# Patient Record
Sex: MALE | Race: BLACK OR AFRICAN AMERICAN | Employment: UNEMPLOYED | URBAN - METROPOLITAN AREA
[De-identification: names, ages, dates, MRNs, and addresses within clinical notes are randomized per-mention and may not be internally consistent; named-entity substitution may affect disease eponyms.]

---

## 2017-06-01 ENCOUNTER — OFFICE VISIT (OUTPATIENT)
Dept: FAMILY MEDICINE CLINIC | Age: 52
End: 2017-06-01
Payer: COMMERCIAL

## 2017-06-01 ENCOUNTER — HOSPITAL ENCOUNTER (OUTPATIENT)
Age: 52
Setting detail: SPECIMEN
Discharge: HOME | End: 2017-06-01
Payer: COMMERCIAL

## 2017-06-01 VITALS
DIASTOLIC BLOOD PRESSURE: 85 MMHG | OXYGEN SATURATION: 93 % | HEIGHT: 65 IN | WEIGHT: 214 LBS | SYSTOLIC BLOOD PRESSURE: 133 MMHG | HEART RATE: 77 BPM | BODY MASS INDEX: 35.65 KG/M2 | TEMPERATURE: 97.5 F

## 2017-06-01 DIAGNOSIS — Z12.5 SCREENING FOR MALIGNANT NEOPLASM OF PROSTATE: ICD-10-CM

## 2017-06-01 DIAGNOSIS — M25.571 CHRONIC PAIN OF RIGHT ANKLE: Primary | ICD-10-CM

## 2017-06-01 DIAGNOSIS — Z13.9 SCREENING: ICD-10-CM

## 2017-06-01 DIAGNOSIS — Z23 NEED FOR PROPHYLACTIC VACCINATION AGAINST DIPHTHERIA-TETANUS-PERTUSSIS (DTP): ICD-10-CM

## 2017-06-01 DIAGNOSIS — Z87.81 H/O FRACTURE OF ANKLE: ICD-10-CM

## 2017-06-01 DIAGNOSIS — G89.29 CHRONIC PAIN OF RIGHT ANKLE: Primary | ICD-10-CM

## 2017-06-01 DIAGNOSIS — Z76.0 MEDICATION REFILL: ICD-10-CM

## 2017-06-01 LAB
ALBUMIN SERPL-MCNC: 4.3 G/DL (ref 3.5–5.2)
ALBUMIN/GLOBULIN RATIO: 1.4 (ref 1–2.5)
ALP BLD-CCNC: 101 U/L (ref 40–129)
ALT SERPL-CCNC: 42 U/L (ref 5–41)
ANION GAP SERPL CALCULATED.3IONS-SCNC: 12 MMOL/L (ref 9–17)
AST SERPL-CCNC: 25 U/L
BILIRUB SERPL-MCNC: 0.24 MG/DL (ref 0.3–1.2)
BUN BLDV-MCNC: 10 MG/DL (ref 6–20)
BUN/CREAT BLD: ABNORMAL (ref 9–20)
CALCIUM SERPL-MCNC: 9.5 MG/DL (ref 8.6–10.4)
CHLORIDE BLD-SCNC: 104 MMOL/L (ref 98–107)
CHOLESTEROL/HDL RATIO: 5.8
CHOLESTEROL: 226 MG/DL
CO2: 24 MMOL/L (ref 20–31)
CREAT SERPL-MCNC: 0.91 MG/DL (ref 0.7–1.2)
GFR AFRICAN AMERICAN: >60 ML/MIN
GFR NON-AFRICAN AMERICAN: >60 ML/MIN
GFR SERPL CREATININE-BSD FRML MDRD: ABNORMAL ML/MIN/{1.73_M2}
GFR SERPL CREATININE-BSD FRML MDRD: ABNORMAL ML/MIN/{1.73_M2}
GLUCOSE BLD-MCNC: 81 MG/DL (ref 70–99)
HCT VFR BLD CALC: 46.7 % (ref 41–53)
HDLC SERPL-MCNC: 39 MG/DL
HEMOGLOBIN: 15 G/DL (ref 13.5–17.5)
HEPATITIS C ANTIBODY: NONREACTIVE
HIV AG/AB: NONREACTIVE
LDL CHOLESTEROL: 158 MG/DL (ref 0–130)
MCH RBC QN AUTO: 24.7 PG (ref 26–34)
MCHC RBC AUTO-ENTMCNC: 32.2 G/DL (ref 31–37)
MCV RBC AUTO: 76.9 FL (ref 80–100)
PDW BLD-RTO: 16.8 % (ref 12.5–15.4)
PLATELET # BLD: 201 K/UL (ref 140–450)
PMV BLD AUTO: 9.9 FL (ref 6–12)
POTASSIUM SERPL-SCNC: 4.9 MMOL/L (ref 3.7–5.3)
PROSTATE SPECIFIC ANTIGEN: 0.95 UG/L
RBC # BLD: 6.07 M/UL (ref 4.5–5.9)
SODIUM BLD-SCNC: 140 MMOL/L (ref 135–144)
TOTAL PROTEIN: 7.4 G/DL (ref 6.4–8.3)
TRIGL SERPL-MCNC: 146 MG/DL
VLDLC SERPL CALC-MCNC: ABNORMAL MG/DL (ref 1–30)
WBC # BLD: 10.1 K/UL (ref 3.5–11)

## 2017-06-01 PROCEDURE — 99214 OFFICE O/P EST MOD 30 MIN: CPT | Performed by: NURSE PRACTITIONER

## 2017-06-01 RX ORDER — RANITIDINE 300 MG/1
300 TABLET ORAL NIGHTLY
Qty: 30 TABLET | Refills: 5 | Status: SHIPPED | OUTPATIENT
Start: 2017-06-01 | End: 2018-05-02 | Stop reason: SDUPTHER

## 2017-06-01 RX ORDER — SIMVASTATIN 10 MG
TABLET ORAL
Qty: 30 TABLET | Refills: 5 | Status: SHIPPED | OUTPATIENT
Start: 2017-06-01 | End: 2018-01-05 | Stop reason: SDUPTHER

## 2017-06-01 ASSESSMENT — ENCOUNTER SYMPTOMS
SHORTNESS OF BREATH: 0
CHEST TIGHTNESS: 0
CONSTIPATION: 0
ABDOMINAL PAIN: 0
NAUSEA: 0
DIARRHEA: 0
COUGH: 0

## 2017-06-01 ASSESSMENT — PATIENT HEALTH QUESTIONNAIRE - PHQ9
1. LITTLE INTEREST OR PLEASURE IN DOING THINGS: 0
2. FEELING DOWN, DEPRESSED OR HOPELESS: 0
SUM OF ALL RESPONSES TO PHQ9 QUESTIONS 1 & 2: 0
SUM OF ALL RESPONSES TO PHQ QUESTIONS 1-9: 0

## 2018-01-05 DIAGNOSIS — Z76.0 MEDICATION REFILL: ICD-10-CM

## 2018-01-08 RX ORDER — SIMVASTATIN 10 MG
TABLET ORAL
Qty: 90 TABLET | Refills: 1 | Status: SHIPPED | OUTPATIENT
Start: 2018-01-08 | End: 2018-05-02 | Stop reason: SDUPTHER

## 2018-05-02 ENCOUNTER — OFFICE VISIT (OUTPATIENT)
Dept: FAMILY MEDICINE CLINIC | Age: 53
End: 2018-05-02
Payer: COMMERCIAL

## 2018-05-02 VITALS
RESPIRATION RATE: 20 BRPM | TEMPERATURE: 96.5 F | DIASTOLIC BLOOD PRESSURE: 74 MMHG | SYSTOLIC BLOOD PRESSURE: 122 MMHG | WEIGHT: 210 LBS | HEART RATE: 76 BPM | HEIGHT: 65 IN | OXYGEN SATURATION: 97 % | BODY MASS INDEX: 34.99 KG/M2

## 2018-05-02 DIAGNOSIS — Z76.0 MEDICATION REFILL: ICD-10-CM

## 2018-05-02 DIAGNOSIS — K21.9 GASTROESOPHAGEAL REFLUX DISEASE, ESOPHAGITIS PRESENCE NOT SPECIFIED: ICD-10-CM

## 2018-05-02 DIAGNOSIS — Z71.6 ENCOUNTER FOR SMOKING CESSATION COUNSELING: ICD-10-CM

## 2018-05-02 DIAGNOSIS — M25.571 CHRONIC PAIN OF RIGHT ANKLE: Primary | ICD-10-CM

## 2018-05-02 DIAGNOSIS — E78.2 MIXED HYPERLIPIDEMIA: ICD-10-CM

## 2018-05-02 DIAGNOSIS — Z12.5 SCREENING FOR MALIGNANT NEOPLASM OF PROSTATE: ICD-10-CM

## 2018-05-02 DIAGNOSIS — G89.29 CHRONIC PAIN OF RIGHT ANKLE: Primary | ICD-10-CM

## 2018-05-02 PROCEDURE — 99396 PREV VISIT EST AGE 40-64: CPT | Performed by: NURSE PRACTITIONER

## 2018-05-02 RX ORDER — SIMVASTATIN 10 MG
TABLET ORAL
Qty: 90 TABLET | Refills: 1 | Status: SHIPPED | OUTPATIENT
Start: 2018-05-02 | End: 2019-01-08 | Stop reason: SDUPTHER

## 2018-05-02 RX ORDER — RANITIDINE 300 MG/1
300 TABLET ORAL NIGHTLY
Qty: 90 TABLET | Refills: 1 | Status: SHIPPED | OUTPATIENT
Start: 2018-05-02 | End: 2019-01-31 | Stop reason: SDUPTHER

## 2018-05-02 ASSESSMENT — ENCOUNTER SYMPTOMS
ABDOMINAL PAIN: 0
COUGH: 0
NAUSEA: 0
CONSTIPATION: 0
CHEST TIGHTNESS: 0
DIARRHEA: 0
SHORTNESS OF BREATH: 0

## 2018-07-25 ENCOUNTER — HOSPITAL ENCOUNTER (OUTPATIENT)
Age: 53
Setting detail: SPECIMEN
Discharge: HOME | End: 2018-07-25
Payer: COMMERCIAL

## 2018-07-25 DIAGNOSIS — Z12.5 SCREENING FOR MALIGNANT NEOPLASM OF PROSTATE: ICD-10-CM

## 2018-07-25 DIAGNOSIS — K21.9 GASTROESOPHAGEAL REFLUX DISEASE, ESOPHAGITIS PRESENCE NOT SPECIFIED: ICD-10-CM

## 2018-07-25 DIAGNOSIS — E78.2 MIXED HYPERLIPIDEMIA: ICD-10-CM

## 2018-07-25 LAB
ALBUMIN SERPL-MCNC: 4.4 G/DL (ref 3.5–5.2)
ALBUMIN/GLOBULIN RATIO: 1.3 (ref 1–2.5)
ALP BLD-CCNC: 103 U/L (ref 40–129)
ALT SERPL-CCNC: 31 U/L (ref 5–41)
ANION GAP SERPL CALCULATED.3IONS-SCNC: 19 MMOL/L (ref 9–17)
AST SERPL-CCNC: 25 U/L
BILIRUB SERPL-MCNC: 0.47 MG/DL (ref 0.3–1.2)
BUN BLDV-MCNC: 12 MG/DL (ref 6–20)
BUN/CREAT BLD: ABNORMAL (ref 9–20)
CALCIUM SERPL-MCNC: 9.3 MG/DL (ref 8.6–10.4)
CHLORIDE BLD-SCNC: 106 MMOL/L (ref 98–107)
CHOLESTEROL/HDL RATIO: 4.5
CHOLESTEROL: 181 MG/DL
CO2: 20 MMOL/L (ref 20–31)
CREAT SERPL-MCNC: 0.94 MG/DL (ref 0.7–1.2)
GFR AFRICAN AMERICAN: >60 ML/MIN
GFR NON-AFRICAN AMERICAN: >60 ML/MIN
GFR SERPL CREATININE-BSD FRML MDRD: ABNORMAL ML/MIN/{1.73_M2}
GFR SERPL CREATININE-BSD FRML MDRD: ABNORMAL ML/MIN/{1.73_M2}
GLUCOSE BLD-MCNC: 104 MG/DL (ref 70–99)
HCT VFR BLD CALC: 48.6 % (ref 40.7–50.3)
HDLC SERPL-MCNC: 40 MG/DL
HEMOGLOBIN: 15.1 G/DL (ref 13–17)
LDL CHOLESTEROL: 124 MG/DL (ref 0–130)
MCH RBC QN AUTO: 24 PG (ref 25.2–33.5)
MCHC RBC AUTO-ENTMCNC: 31.1 G/DL (ref 28.4–34.8)
MCV RBC AUTO: 77.3 FL (ref 82.6–102.9)
NRBC AUTOMATED: 0 PER 100 WBC
PDW BLD-RTO: 18 % (ref 11.8–14.4)
PLATELET # BLD: 212 K/UL (ref 138–453)
PMV BLD AUTO: 11.9 FL (ref 8.1–13.5)
POTASSIUM SERPL-SCNC: 5 MMOL/L (ref 3.7–5.3)
PROSTATE SPECIFIC ANTIGEN: 1 UG/L
RBC # BLD: 6.29 M/UL (ref 4.21–5.77)
SODIUM BLD-SCNC: 145 MMOL/L (ref 135–144)
TOTAL PROTEIN: 7.8 G/DL (ref 6.4–8.3)
TRIGL SERPL-MCNC: 87 MG/DL
VLDLC SERPL CALC-MCNC: ABNORMAL MG/DL (ref 1–30)
WBC # BLD: 10.2 K/UL (ref 3.5–11.3)

## 2018-10-09 ENCOUNTER — OFFICE VISIT (OUTPATIENT)
Dept: FAMILY MEDICINE CLINIC | Age: 53
End: 2018-10-09
Payer: COMMERCIAL

## 2018-10-09 VITALS
DIASTOLIC BLOOD PRESSURE: 86 MMHG | BODY MASS INDEX: 34.91 KG/M2 | WEIGHT: 209.8 LBS | HEART RATE: 72 BPM | SYSTOLIC BLOOD PRESSURE: 130 MMHG | OXYGEN SATURATION: 95 % | TEMPERATURE: 97.2 F

## 2018-10-09 DIAGNOSIS — M25.571 CHRONIC PAIN OF RIGHT ANKLE: Primary | ICD-10-CM

## 2018-10-09 DIAGNOSIS — G89.29 CHRONIC PAIN OF RIGHT ANKLE: Primary | ICD-10-CM

## 2018-10-09 PROCEDURE — 99213 OFFICE O/P EST LOW 20 MIN: CPT | Performed by: NURSE PRACTITIONER

## 2018-10-09 ASSESSMENT — PATIENT HEALTH QUESTIONNAIRE - PHQ9
SUM OF ALL RESPONSES TO PHQ QUESTIONS 1-9: 0
SUM OF ALL RESPONSES TO PHQ9 QUESTIONS 1 & 2: 0
2. FEELING DOWN, DEPRESSED OR HOPELESS: 0
SUM OF ALL RESPONSES TO PHQ QUESTIONS 1-9: 0
1. LITTLE INTEREST OR PLEASURE IN DOING THINGS: 0

## 2018-10-09 NOTE — PROGRESS NOTES
Systems   Constitutional: Negative for chills and fever. Cardiovascular: Negative for chest pain, palpitations and leg swelling. Musculoskeletal: Positive for arthralgias (right ankle). Other pertinent ROS in HPI  Objective:     /86 (Site: Left Upper Arm, Position: Sitting, Cuff Size: Large Adult)   Pulse 72   Temp 97.2 °F (36.2 °C) (Oral)   Wt 209 lb 12.8 oz (95.2 kg)   SpO2 95%   BMI 34.91 kg/m²    Physical Exam   Constitutional: He is oriented to person, place, and time. He appears well-developed and well-nourished. No distress. Cardiovascular: Normal rate, regular rhythm and normal heart sounds. Pulmonary/Chest: Effort normal and breath sounds normal.   Musculoskeletal:        Right ankle: He exhibits decreased range of motion and swelling. He exhibits no ecchymosis and no deformity. Tenderness. Lateral malleolus tenderness found. Neurological: He is alert and oriented to person, place, and time. Skin: Skin is warm and dry. Psychiatric: He has a normal mood and affect. Assessment/PLAN     1. Chronic pain of right ankle  Will give referral to podiatry  Will hold off on xrays as they will perform them there. Pt agreeable with plan. - [address] [place] [address], [facility], [facility] [address] [place] [address] if symptoms worsen or fail to improve  Pt agreeable with plan      Patient given educational materials - see patient instructions. Discussed use, benefit, and side effects of prescribed medications. All patient questions answered. Pt voiced understanding. Reviewed health maintenance. Instructed to continue current medications, diet and exercise. 1.  Otis received counseling on the following healthy behaviors: nutrition and medication adherence  2. Patient given educational materials when available - see patient instructions when applicable  3. Discussed use, benefit, and side effects of prescribed medications. Barriers to medication compliance addressed.   All patient questions answered. Pt voiced understanding. 4.  Reviewed prior labs and health maintenance  5. Continue current medications, diet and exercise.     Completed Refills   Requested Prescriptions      No prescriptions requested or ordered in this encounter         Electronically signed by Cj Cheney CNP on 10/9/2018 at 11:38 AM

## 2018-10-10 ENCOUNTER — OFFICE VISIT (OUTPATIENT)
Dept: PODIATRY | Age: 53
End: 2018-10-10
Payer: COMMERCIAL

## 2018-10-10 DIAGNOSIS — M19.071 ARTHRITIS OF RIGHT ANKLE: Primary | ICD-10-CM

## 2018-10-10 DIAGNOSIS — T84.84XA PAINFUL ORTHOPAEDIC HARDWARE (HCC): ICD-10-CM

## 2018-10-10 DIAGNOSIS — R60.0 EDEMA OF LOWER EXTREMITY: ICD-10-CM

## 2018-10-10 DIAGNOSIS — M79.604 PAIN OF RIGHT LOWER EXTREMITY: ICD-10-CM

## 2018-10-10 DIAGNOSIS — R26.2 DIFFICULTY WALKING: ICD-10-CM

## 2018-10-10 PROCEDURE — 73610 X-RAY EXAM OF ANKLE: CPT | Performed by: PODIATRIST

## 2018-10-10 PROCEDURE — 99203 OFFICE O/P NEW LOW 30 MIN: CPT | Performed by: PODIATRIST

## 2018-10-17 VITALS — BODY MASS INDEX: 33.59 KG/M2 | HEIGHT: 66 IN | RESPIRATION RATE: 16 BRPM | WEIGHT: 209 LBS

## 2019-01-08 DIAGNOSIS — Z76.0 MEDICATION REFILL: ICD-10-CM

## 2019-01-10 RX ORDER — SIMVASTATIN 10 MG
TABLET ORAL
Qty: 90 TABLET | Refills: 1 | Status: SHIPPED | OUTPATIENT
Start: 2019-01-10 | End: 2019-05-06 | Stop reason: SDUPTHER

## 2019-01-31 DIAGNOSIS — Z76.0 MEDICATION REFILL: ICD-10-CM

## 2019-02-05 RX ORDER — RANITIDINE 300 MG/1
TABLET ORAL
Qty: 90 TABLET | Refills: 1 | Status: SHIPPED | OUTPATIENT
Start: 2019-02-05 | End: 2019-10-30 | Stop reason: SDUPTHER

## 2019-05-06 ENCOUNTER — OFFICE VISIT (OUTPATIENT)
Dept: FAMILY MEDICINE CLINIC | Age: 54
End: 2019-05-06
Payer: COMMERCIAL

## 2019-05-06 VITALS
DIASTOLIC BLOOD PRESSURE: 84 MMHG | WEIGHT: 205.6 LBS | TEMPERATURE: 97.9 F | BODY MASS INDEX: 33.18 KG/M2 | OXYGEN SATURATION: 96 % | HEART RATE: 70 BPM | SYSTOLIC BLOOD PRESSURE: 124 MMHG

## 2019-05-06 DIAGNOSIS — M25.571 CHRONIC PAIN OF RIGHT ANKLE: ICD-10-CM

## 2019-05-06 DIAGNOSIS — G89.29 CHRONIC PAIN OF RIGHT ANKLE: ICD-10-CM

## 2019-05-06 DIAGNOSIS — R73.03 PRE-DIABETES: ICD-10-CM

## 2019-05-06 DIAGNOSIS — Z91.89 CANDIDATE FOR STATIN THERAPY DUE TO RISK OF FUTURE CARDIOVASCULAR EVENT: ICD-10-CM

## 2019-05-06 DIAGNOSIS — Z00.00 PHYSICAL EXAM: Primary | ICD-10-CM

## 2019-05-06 DIAGNOSIS — Z12.5 SCREENING FOR MALIGNANT NEOPLASM OF PROSTATE: ICD-10-CM

## 2019-05-06 LAB — HBA1C MFR BLD: 5.7 %

## 2019-05-06 PROCEDURE — 99396 PREV VISIT EST AGE 40-64: CPT | Performed by: NURSE PRACTITIONER

## 2019-05-06 PROCEDURE — 83036 HEMOGLOBIN GLYCOSYLATED A1C: CPT | Performed by: NURSE PRACTITIONER

## 2019-05-06 RX ORDER — SIMVASTATIN 20 MG
20 TABLET ORAL NIGHTLY
Qty: 30 TABLET | Refills: 5 | Status: SHIPPED | OUTPATIENT
Start: 2019-05-06 | End: 2019-11-06 | Stop reason: SDUPTHER

## 2019-05-06 ASSESSMENT — ENCOUNTER SYMPTOMS
NAUSEA: 0
CONSTIPATION: 0
ABDOMINAL PAIN: 0
COUGH: 0
SHORTNESS OF BREATH: 0
DIARRHEA: 0
CHEST TIGHTNESS: 0

## 2019-05-06 ASSESSMENT — PATIENT HEALTH QUESTIONNAIRE - PHQ9
SUM OF ALL RESPONSES TO PHQ QUESTIONS 1-9: 0
SUM OF ALL RESPONSES TO PHQ QUESTIONS 1-9: 0
SUM OF ALL RESPONSES TO PHQ9 QUESTIONS 1 & 2: 0
1. LITTLE INTEREST OR PLEASURE IN DOING THINGS: 0
2. FEELING DOWN, DEPRESSED OR HOPELESS: 0

## 2019-05-06 NOTE — PROGRESS NOTES
2019    Renetta Steward (:  1965) is a 46 y.o. male, here for a preventive medicine evaluation. Patient Active Problem List   Diagnosis    Gastroesophageal reflux disease    Erectile dysfunction    Mixed hyperlipidemia    Chronic pain of right ankle       Review of Systems   Constitutional: Negative for activity change, appetite change, chills and fever. HENT: Negative for congestion, ear pain and hearing loss. Eyes: Negative for visual disturbance. Respiratory: Negative for cough, chest tightness and shortness of breath. Cardiovascular: Negative for chest pain, palpitations and leg swelling. Gastrointestinal: Negative for abdominal pain, constipation, diarrhea and nausea. Genitourinary: Negative for discharge and frequency. Musculoskeletal: Positive for arthralgias (chronic right ankle). Negative for myalgias. Skin: Negative for rash. Neurological: Negative for dizziness, light-headedness and headaches. Psychiatric/Behavioral: Negative for dysphoric mood and self-injury. Prior to Visit Medications    Medication Sig Taking?  Authorizing Provider   simvastatin (ZOCOR) 20 MG tablet Take 1 tablet by mouth nightly Yes EVANGELIST Coleman CNP   ranitidine (ZANTAC) 300 MG tablet take 1 tablet by mouth every evening Yes EVANGELIST Coleman CNP   nicotine (NICODERM CQ) 7 MG/24HR Place 1 patch onto the skin every 24 hours Yes EVANGELIST Coleman CNP        No Known Allergies    Past Medical History:   Diagnosis Date    Depression     Erectile dysfunction 2016    Gastroesophageal reflux disease 2016    Mixed hyperlipidemia 2016       Past Surgical History:   Procedure Laterality Date    FRACTURE SURGERY  2005    ankle surgery       Social History     Socioeconomic History    Marital status: Single     Spouse name: Not on file    Number of children: Not on file    Years of education: Not on file    Highest education level: Not on file   Occupational History    Occupation: SSI   Social Needs    Financial resource strain: Not on file    Food insecurity:     Worry: Not on file     Inability: Not on file    Transportation needs:     Medical: Not on file     Non-medical: Not on file   Tobacco Use    Smoking status: Current Every Day Smoker     Packs/day: 0.50     Years: 30.00     Pack years: 15.00     Types: Cigarettes    Smokeless tobacco: Never Used   Substance and Sexual Activity    Alcohol use: Yes     Alcohol/week: 0.6 oz     Types: 1 Cans of beer per week    Drug use: Yes     Frequency: 5.0 times per week     Types: Marijuana    Sexual activity: Not on file   Lifestyle    Physical activity:     Days per week: Not on file     Minutes per session: Not on file    Stress: Not on file   Relationships    Social connections:     Talks on phone: Not on file     Gets together: Not on file     Attends Synagogue service: Not on file     Active member of club or organization: Not on file     Attends meetings of clubs or organizations: Not on file     Relationship status: Not on file    Intimate partner violence:     Fear of current or ex partner: Not on file     Emotionally abused: Not on file     Physically abused: Not on file     Forced sexual activity: Not on file   Other Topics Concern    Not on file   Social History Narrative    Not on file        Family History   Problem Relation Age of Onset    Other Mother 35        pneumonia    Other Father         alzheimers    Cancer Father         stomach    Diabetes Brother        ADVANCE DIRECTIVE: N, Not Received    Vitals:    05/06/19 0929   BP: 124/84   Site: Left Upper Arm   Position: Sitting   Cuff Size: Large Adult   Pulse: 70   Temp: 97.9 °F (36.6 °C)   TempSrc: Oral   SpO2: 96%   Weight: 205 lb 9.6 oz (93.3 kg)     Estimated body mass index is 33.18 kg/m² as calculated from the following:    Height as of 10/10/18: 5' 6\" (1.676 m).     Weight as of this encounter: 205 lb 9.6 oz (93.3 kg). Physical Exam   Constitutional: He is oriented to person, place, and time. He appears well-developed and well-nourished. HENT:   Head: Normocephalic and atraumatic. Right Ear: External ear normal.   Left Ear: External ear normal.   Nose: Nose normal.   Mouth/Throat: Oropharynx is clear and moist.   Eyes: Pupils are equal, round, and reactive to light. Conjunctivae and EOM are normal.   Neck: Normal range of motion. Cardiovascular: Normal rate, regular rhythm and normal heart sounds. Pulmonary/Chest: Effort normal and breath sounds normal.   Abdominal: Soft. Bowel sounds are normal.   Musculoskeletal: Normal range of motion. Pain free ROM     Neurological: He is alert and oriented to person, place, and time. Gait normal     Skin: Skin is warm and dry. No rash noted. Psychiatric: He has a normal mood and affect. His behavior is normal. Judgment and thought content normal.       No flowsheet data found. Lab Results   Component Value Date    CHOL 181 07/25/2018    CHOL 226 06/01/2017    CHOL 196 01/19/2016    TRIG 87 07/25/2018    TRIG 146 06/01/2017    TRIG 114 01/19/2016    HDL 40 07/25/2018    HDL 39 06/01/2017    HDL 44 01/19/2016    LDLCHOLESTEROL 124 07/25/2018    LDLCHOLESTEROL 158 06/01/2017    LDLCHOLESTEROL 129 01/19/2016    GLUCOSE 104 07/25/2018    LABA1C 5.7 05/06/2019    LABA1C 5.5 01/19/2016       The 10-year ASCVD risk score (Lian Sprague, et al., 2013) is: 10.6%    Values used to calculate the score:      Age: 46 years      Sex: Male      Is Non- : Yes      Diabetic: No      Tobacco smoker: Yes      Systolic Blood Pressure: 778 mmHg      Is BP treated: No      HDL Cholesterol: 40 mg/dL      Total Cholesterol: 181 mg/dL      There is no immunization history on file for this patient.     Health Maintenance   Topic Date Due    Diabetes screen  01/19/2019    Flu vaccine (Season Ended) 10/09/2019 (Originally 9/1/2019)    DTaP/Tdap/Td vaccine (1 - Tdap) 05/06/2020 (Originally 5/17/1983)    Shingles Vaccine (1 of 2) 05/06/2020 (Originally 5/17/2014)    Pneumococcal 0-64 years Vaccine (1 of 1 - PPSV23) 05/06/2020 (Originally 5/17/1970)    Lipid screen  07/25/2023    Colon cancer screen colonoscopy  02/02/2027    Hepatitis C screen  Completed    HIV screen  Completed       ASSESSMENT/PLAN:  1. Physical exam      2. Pre-diabetes  Discussed a1c  Encouraged to cut back on carbs and increase exercise. - POCT glycosylated hemoglobin (Hb A1C)  - CBC; Future  - Basic Metabolic Panel; Future    3. Chronic pain of right ankle    - Handicap placard    4. Screening for malignant neoplasm of prostate    - Psa screening; Future    5. Candidate for statin therapy due to risk of future cardiovascular event  Increase dose  Encouraged to stop smoking.   - simvastatin (ZOCOR) 20 MG tablet; Take 1 tablet by mouth nightly  Dispense: 30 tablet; Refill: 5      No follow-ups on file. An electronic signature was used to authenticate this note.     --EVANGELIST Wu - CNP on 5/6/2019 at 9:46 AM

## 2019-05-06 NOTE — PROGRESS NOTES
Patient is present for annual physical    Pharmacy and medication reviewed with patient    Visit Information    Have you changed or started any medications since your last visit including any over-the-counter medicines, vitamins, or herbal medicines? no   Have you stopped taking any of your medications? Is so, why? -  no  Are you having any side effects from any of your medications? - no    Have you seen any other physician or provider since your last visit?  no   Have you had any other diagnostic tests since your last visit?  no   Have you been seen in the emergency room and/or had an admission in a hospital since we last saw you?  no   Have you had your routine dental cleaning in the past 6 months?  no     Do you have an active MyChart account? If no, what is the barrier?   No: declined    Patient Care Team:  EVANGELIST Sanford CNP as PCP - General (Nurse Practitioner Family)  EVANGELIST Sanford CNP as PCP - S Attributed Provider  Jesse Blue DPM as Physician (Podiatry)    Medical History Review  Past Medical, Family, and Social History reviewed and does contribute to the patient presenting condition    Health Maintenance   Topic Date Due    Pneumococcal 0-64 years Vaccine (1 of 1 - PPSV23) 05/17/1970    DTaP/Tdap/Td vaccine (1 - Tdap) 05/17/1983    Shingles Vaccine (1 of 2) 05/17/2014    Diabetes screen  01/19/2019    Flu vaccine (Season Ended) 10/09/2019 (Originally 9/1/2019)    Lipid screen  07/25/2023    Colon cancer screen colonoscopy  02/02/2027    Hepatitis C screen  Completed    HIV screen  Completed

## 2019-09-04 ENCOUNTER — HOSPITAL ENCOUNTER (OUTPATIENT)
Age: 54
Setting detail: SPECIMEN
Discharge: HOME | End: 2019-09-04
Payer: COMMERCIAL

## 2019-09-04 DIAGNOSIS — Z12.5 SCREENING FOR MALIGNANT NEOPLASM OF PROSTATE: ICD-10-CM

## 2019-09-04 DIAGNOSIS — R73.03 PRE-DIABETES: ICD-10-CM

## 2019-09-04 LAB
ANION GAP SERPL CALCULATED.3IONS-SCNC: 14 MMOL/L (ref 9–17)
BUN BLDV-MCNC: 15 MG/DL (ref 6–20)
BUN/CREAT BLD: ABNORMAL (ref 9–20)
CALCIUM SERPL-MCNC: 9.2 MG/DL (ref 8.6–10.4)
CHLORIDE BLD-SCNC: 109 MMOL/L (ref 98–107)
CO2: 22 MMOL/L (ref 20–31)
CREAT SERPL-MCNC: 0.9 MG/DL (ref 0.7–1.2)
GFR AFRICAN AMERICAN: >60 ML/MIN
GFR NON-AFRICAN AMERICAN: >60 ML/MIN
GFR SERPL CREATININE-BSD FRML MDRD: ABNORMAL ML/MIN/{1.73_M2}
GFR SERPL CREATININE-BSD FRML MDRD: ABNORMAL ML/MIN/{1.73_M2}
GLUCOSE BLD-MCNC: 97 MG/DL (ref 70–99)
HCT VFR BLD CALC: 44.7 % (ref 40.7–50.3)
HEMOGLOBIN: 14.4 G/DL (ref 13–17)
MCH RBC QN AUTO: 25.1 PG (ref 25.2–33.5)
MCHC RBC AUTO-ENTMCNC: 32.2 G/DL (ref 28.4–34.8)
MCV RBC AUTO: 78 FL (ref 82.6–102.9)
NRBC AUTOMATED: 0 PER 100 WBC
PDW BLD-RTO: 17 % (ref 11.8–14.4)
PLATELET # BLD: 201 K/UL (ref 138–453)
PMV BLD AUTO: 12.3 FL (ref 8.1–13.5)
POTASSIUM SERPL-SCNC: 4.8 MMOL/L (ref 3.7–5.3)
PROSTATE SPECIFIC ANTIGEN: 1.06 UG/L
RBC # BLD: 5.73 M/UL (ref 4.21–5.77)
SODIUM BLD-SCNC: 145 MMOL/L (ref 135–144)
WBC # BLD: 8.2 K/UL (ref 3.5–11.3)

## 2019-10-30 DIAGNOSIS — Z76.0 MEDICATION REFILL: ICD-10-CM

## 2019-10-31 RX ORDER — RANITIDINE 300 MG/1
TABLET ORAL
Qty: 90 TABLET | Refills: 1 | Status: SHIPPED | OUTPATIENT
Start: 2019-10-31 | End: 2019-11-27

## 2019-11-06 DIAGNOSIS — Z91.89 CANDIDATE FOR STATIN THERAPY DUE TO RISK OF FUTURE CARDIOVASCULAR EVENT: ICD-10-CM

## 2019-11-07 RX ORDER — SIMVASTATIN 20 MG
TABLET ORAL
Qty: 30 TABLET | Refills: 0 | Status: SHIPPED | OUTPATIENT
Start: 2019-11-07 | End: 2019-12-11 | Stop reason: SDUPTHER

## 2019-11-27 ENCOUNTER — TELEPHONE (OUTPATIENT)
Dept: FAMILY MEDICINE CLINIC | Age: 54
End: 2019-11-27

## 2019-11-27 RX ORDER — OMEPRAZOLE 20 MG/1
20 CAPSULE, DELAYED RELEASE ORAL
Qty: 90 CAPSULE | Refills: 1 | Status: SHIPPED | OUTPATIENT
Start: 2019-11-27 | End: 2020-05-19 | Stop reason: SDUPTHER

## 2020-05-19 ENCOUNTER — TELEMEDICINE (OUTPATIENT)
Dept: FAMILY MEDICINE CLINIC | Age: 55
End: 2020-05-19
Payer: COMMERCIAL

## 2020-05-19 PROCEDURE — 99213 OFFICE O/P EST LOW 20 MIN: CPT | Performed by: NURSE PRACTITIONER

## 2020-05-19 RX ORDER — OMEPRAZOLE 20 MG/1
20 CAPSULE, DELAYED RELEASE ORAL
Qty: 90 CAPSULE | Refills: 1 | Status: SHIPPED | OUTPATIENT
Start: 2020-05-19 | End: 2020-11-13

## 2020-05-19 RX ORDER — SIMVASTATIN 20 MG
20 TABLET ORAL NIGHTLY
Qty: 90 TABLET | Refills: 1 | Status: SHIPPED | OUTPATIENT
Start: 2020-05-19 | End: 2020-11-30

## 2020-05-19 ASSESSMENT — ENCOUNTER SYMPTOMS
COUGH: 0
SHORTNESS OF BREATH: 0

## 2020-05-19 ASSESSMENT — PATIENT HEALTH QUESTIONNAIRE - PHQ9
1. LITTLE INTEREST OR PLEASURE IN DOING THINGS: 0
SUM OF ALL RESPONSES TO PHQ9 QUESTIONS 1 & 2: 0
SUM OF ALL RESPONSES TO PHQ QUESTIONS 1-9: 0
2. FEELING DOWN, DEPRESSED OR HOPELESS: 0
SUM OF ALL RESPONSES TO PHQ QUESTIONS 1-9: 0

## 2020-05-19 NOTE — PROGRESS NOTES
person/place/time [x]Able to follow commands       Eyes:  EOM    [x]  Normal  [] Abnormal-  Sclera  [x]  Normal  [] Abnormal -         Discharge [x]  None visible  [] Abnormal -     HENT:   [x] Normocephalic, atraumatic. [] Abnormal   [] Mouth/Throat: Mucous membranes are moist.      External Ears [x] Normal  [] Abnormal-      Neck: [x] No visualized mass      Pulmonary/Chest: [x] Respiratory effort normal.  [] No visualized signs of difficulty breathing or respiratory distress        [] Abnormal-      Musculoskeletal:   [] Normal gait with no signs of ataxia         [x] Normal range of motion of neck        [] Abnormal-   [] Motor grossly intact in visible upper extremities    [] Motor grossly intact in visible lower extremities        Neurological:        [x] No Facial Asymmetry (Cranial nerve 7 motor function) (limited exam to video visit)                       [x] No gaze palsy        [] Abnormal-         Skin:                     [x] No significant exanthematous lesions or discoloration noted on facial skin         [] Abnormal-                                  Psychiatric:           [x] Normal Affect [] No Hallucinations        [] Abnormal- [] Normal Mood  [] Anxious appearing    [] Depressed appearing  [] Confused appearing      Due to this being a TeleHealth encounter, evaluation of the following organ systems is limited: Vitals/Constitutional/EENT/Resp/CV/GI//MS/Neuro/Skin/Heme-Lymph-Imm. ASSESSMENT/PLAN:  Encounter Diagnoses   Name Primary?  Candidate for statin therapy due to risk of future cardiovascular event     Chronic pain of right ankle Yes       No follow-ups on file. The time that was spent with the family/patient addressing care on this video call and chart review was 15  minutes. An  electronic signature was used to authenticate this note.     --EVANGELIST Renner - CNP on 5/19/2020 at 8:53 AM        Pursuant to the emergency declaration under the [address]

## 2020-06-09 RX ORDER — SIMVASTATIN 20 MG
20 TABLET ORAL NIGHTLY
Qty: 90 TABLET | Refills: 1 | OUTPATIENT
Start: 2020-06-09

## 2020-11-13 RX ORDER — OMEPRAZOLE 20 MG/1
CAPSULE, DELAYED RELEASE ORAL
Qty: 90 CAPSULE | Refills: 1 | Status: SHIPPED | OUTPATIENT
Start: 2020-11-13 | End: 2021-05-10

## 2020-11-13 NOTE — TELEPHONE ENCOUNTER
Last visit: 05/19/2020  Last Med refill: 08/16/2020  Does patient have enough medication for 72 hours: No    LM to schedule for refills. Next Visit Date:  No future appointments.     Health Maintenance   Topic Date Due    Pneumococcal 0-64 years Vaccine (1 of 1 - PPSV23) 05/17/1970    DTaP/Tdap/Td vaccine (1 - Tdap) 05/17/1983    Shingles Vaccine (1 of 2) 05/17/2014    Annual Wellness Visit (AWV)  05/29/2019    Lipid screen  07/25/2019    A1C test (Diabetic or Prediabetic)  05/06/2020    Flu vaccine (1) 09/01/2020    Colon cancer screen colonoscopy  02/02/2027    Hepatitis C screen  Completed    HIV screen  Completed    Hepatitis A vaccine  Aged Out    Hepatitis B vaccine  Aged Out    Hib vaccine  Aged Out    Meningococcal (ACWY) vaccine  Aged Out       Hemoglobin A1C (%)   Date Value   05/06/2019 5.7   01/19/2016 5.5             ( goal A1C is < 7)   No results found for: LABMICR  LDL Cholesterol (mg/dL)   Date Value   07/25/2018 124   06/01/2017 158 (H)       (goal LDL is <100)   AST (U/L)   Date Value   07/25/2018 25     ALT (U/L)   Date Value   07/25/2018 31     BUN (mg/dL)   Date Value   09/04/2019 15     BP Readings from Last 3 Encounters:   05/06/19 124/84   10/09/18 130/86   05/02/18 122/74          (goal 120/80)    All Future Testing planned in [facility]              Patient Active Problem List:     Gastroesophageal reflux disease     Erectile dysfunction     Mixed hyperlipidemia     Chronic pain of right ankle

## 2020-11-30 RX ORDER — SIMVASTATIN 20 MG
TABLET ORAL
Qty: 90 TABLET | Refills: 1 | Status: SHIPPED | OUTPATIENT
Start: 2020-11-30 | End: 2021-06-10

## 2020-12-01 ENCOUNTER — TELEMEDICINE (OUTPATIENT)
Dept: FAMILY MEDICINE CLINIC | Age: 55
End: 2020-12-01
Payer: COMMERCIAL

## 2020-12-01 PROCEDURE — 99214 OFFICE O/P EST MOD 30 MIN: CPT | Performed by: NURSE PRACTITIONER

## 2020-12-01 ASSESSMENT — ENCOUNTER SYMPTOMS
SHORTNESS OF BREATH: 0
COUGH: 0

## 2020-12-01 NOTE — PROGRESS NOTES
VISIT LOCATION: Home    TELEHEALTH EVALUATION -- Audio/Visual (During HTFJE-86 public health emergency)    Due to COVID 23 outbreak, patient's office visit was converted to a virtual visit. Patient was contacted and agreed to proceed with a virtual visit via [address] Visit  The risks and benefits of converting to a virtual visit were discussed in light of the current infectious disease epidemic. Patient also understood that insurance coverage and co-pays are up to their individual insurance plans. Radha Locke (:  1965) has requested an audio/video evaluation for the following concern(s):    Chief Complaint:   HPI:  Edie Layton is here for handicap [facility]. States he never received in may. GI Disorder:  Patient presents for follow-up of GERD- chronic. Current symptoms include none. Symptoms are better since last visit. Patient denies any other symptoms. Current treatment includes: proton pump inhibitor- omeprazole 20 mg.  Medication side effects:  none. Recent diagnostic testing: none. Hyperlipidemia:  No new myalgias or GI upset on simvastatin (Zocor). Medication compliance: compliant all of the time. Patient is  following a low fat, low cholesterol diet. He is  exercising regularly. Lab Results   Component Value Date    CHOL 181 2018    TRIG 87 2018    HDL 40 (L) 2018     Lab Results   Component Value Date    ALT 31 2018    AST 25 2018        Anup Chang does not appear to be due for his colonoscopy, he thinks he was supposed to return in 5 years although the report I have dose not show that, he will call them to confirm      Review of Systems   Constitutional: Negative for chills and fever. Respiratory: Negative for cough and shortness of breath. Cardiovascular: Negative for chest pain and leg swelling. Prior to Visit Medications    Medication Sig Taking?  Authorizing Provider   simvastatin (ZOCOR) 20 MG tablet take 1 tablet by mouth at bedtime Yes EVANGELIST Prakash CNP   omeprazole (PRILOSEC) 20 MG delayed release capsule take 1 capsule by mouth every morning BEFORE BREAKFAST Yes EVANGELIST Prakash CNP     Social- patient smokes    Past Medical History:   Diagnosis Date    Depression     Erectile dysfunction 1/21/2016    Gastroesophageal reflux disease 1/21/2016    Mixed hyperlipidemia 1/21/2016   ,   Past Surgical History:   Procedure Laterality Date    FRACTURE SURGERY  2005    ankle surgery             [] OTHER:    Constitutional: [x] Appears well-developed and well-nourished [] No apparent distress                            [] Abnormal-   Mental status  [x] Alert and awake  [x] Oriented to person/place/time [x]Able to follow commands       Eyes:  EOM    [x]  Normal  [] Abnormal-  Sclera  [x]  Normal  [] Abnormal -         Discharge [x]  None visible  [] Abnormal -     HENT:   [x] Normocephalic, atraumatic.   [] Abnormal   [] Mouth/Throat: Mucous membranes are moist.      External Ears [x] Normal  [] Abnormal-      Neck: [x] No visualized mass      Pulmonary/Chest: [x] Respiratory effort normal.  [] No visualized signs of difficulty breathing or respiratory distress        [] Abnormal-      Musculoskeletal:   [] Normal gait with no signs of ataxia         [x] Normal range of motion of neck        [] Abnormal-   [] Motor grossly intact in visible upper extremities    [] Motor grossly intact in visible lower extremities        Neurological:        [x] No Facial Asymmetry (Cranial nerve 7 motor function) (limited exam to video visit)                       [x] No gaze palsy        [] Abnormal-         Skin:                     [x] No significant exanthematous lesions or discoloration noted on facial skin         [] Abnormal-                                  Psychiatric:           [x] Normal Affect [] No Hallucinations        [] Abnormal- [] Normal Mood  [] Anxious appearing    [] Depressed appearing  [] Confused appearing Due to this being a TeleHealth encounter, evaluation of the following organ systems is limited: Vitals/Constitutional/EENT/Resp/CV/GI//MS/Neuro/Skin/Heme-Lymph-Imm. ASSESSMENT/PLAN:  Encounter Diagnoses   Name Primary?  Elevated hemoglobin A1c Yes    Gastroesophageal reflux disease, unspecified whether esophagitis present     Mixed hyperlipidemia    reminded about needed immunizations    No orders of the defined types were placed in this encounter. No follow-ups on file. The time that was spent with the family/patient addressing care on this video call and chart review was 25 minutes. An  electronic signature was used to authenticate this note. --EVANGELIST Awad - CNP on 12/1/2020 at 8:19 AM        Pursuant to the emergency declaration under the [facility], [facility] waiver authority and the [employer] Act, this Virtual  Visit was conducted, with patient's consent, to reduce the patient's risk of exposure to COVID-19 and provide continuity of care for an established patient. Services were provided through a telephone discussion virtually to substitute for in-person clinic visit.

## 2020-12-01 NOTE — PROGRESS NOTES
Patient is present for med refills    Patient states he did not receive the handicap placard ordered at last visit in May.

## 2021-02-19 ENCOUNTER — HOSPITAL ENCOUNTER (OUTPATIENT)
Age: 56
Setting detail: SPECIMEN
Discharge: HOME | End: 2021-02-19
Payer: COMMERCIAL

## 2021-02-19 DIAGNOSIS — E78.2 MIXED HYPERLIPIDEMIA: ICD-10-CM

## 2021-02-19 DIAGNOSIS — K21.9 GASTROESOPHAGEAL REFLUX DISEASE, UNSPECIFIED WHETHER ESOPHAGITIS PRESENT: ICD-10-CM

## 2021-02-19 DIAGNOSIS — R73.09 ELEVATED HEMOGLOBIN A1C: ICD-10-CM

## 2021-02-19 LAB
ALBUMIN SERPL-MCNC: 4.2 G/DL (ref 3.5–5.2)
ALBUMIN/GLOBULIN RATIO: 1.2 (ref 1–2.5)
ALP BLD-CCNC: 84 U/L (ref 40–129)
ALT SERPL-CCNC: 32 U/L (ref 5–41)
ANION GAP SERPL CALCULATED.3IONS-SCNC: 16 MMOL/L (ref 9–17)
AST SERPL-CCNC: 46 U/L
BILIRUB SERPL-MCNC: 0.48 MG/DL (ref 0.3–1.2)
BUN BLDV-MCNC: 18 MG/DL (ref 6–20)
BUN/CREAT BLD: ABNORMAL (ref 9–20)
CALCIUM SERPL-MCNC: 9.1 MG/DL (ref 8.6–10.4)
CHLORIDE BLD-SCNC: 107 MMOL/L (ref 98–107)
CHOLESTEROL/HDL RATIO: 4
CHOLESTEROL: 151 MG/DL
CO2: 18 MMOL/L (ref 20–31)
CREAT SERPL-MCNC: 1.04 MG/DL (ref 0.7–1.2)
ESTIMATED AVERAGE GLUCOSE: 117 MG/DL
GFR AFRICAN AMERICAN: >60 ML/MIN
GFR NON-AFRICAN AMERICAN: >60 ML/MIN
GFR SERPL CREATININE-BSD FRML MDRD: ABNORMAL ML/MIN/{1.73_M2}
GFR SERPL CREATININE-BSD FRML MDRD: ABNORMAL ML/MIN/{1.73_M2}
GLUCOSE BLD-MCNC: 111 MG/DL (ref 70–99)
HBA1C MFR BLD: 5.7 % (ref 4–6)
HCT VFR BLD CALC: 45.7 % (ref 40.7–50.3)
HDLC SERPL-MCNC: 38 MG/DL
HEMOGLOBIN: 14.3 G/DL (ref 13–17)
LDL CHOLESTEROL: 96 MG/DL (ref 0–130)
MCH RBC QN AUTO: 24.1 PG (ref 25.2–33.5)
MCHC RBC AUTO-ENTMCNC: 31.3 G/DL (ref 28.4–34.8)
MCV RBC AUTO: 76.9 FL (ref 82.6–102.9)
NRBC AUTOMATED: 0 PER 100 WBC
PDW BLD-RTO: 16.4 % (ref 11.8–14.4)
PLATELET # BLD: 221 K/UL (ref 138–453)
PMV BLD AUTO: 11.3 FL (ref 8.1–13.5)
POTASSIUM SERPL-SCNC: 4.8 MMOL/L (ref 3.7–5.3)
RBC # BLD: 5.94 M/UL (ref 4.21–5.77)
SODIUM BLD-SCNC: 141 MMOL/L (ref 135–144)
TOTAL PROTEIN: 7.6 G/DL (ref 6.4–8.3)
TRIGL SERPL-MCNC: 87 MG/DL
VLDLC SERPL CALC-MCNC: ABNORMAL MG/DL (ref 1–30)
WBC # BLD: 8 K/UL (ref 3.5–11.3)

## 2021-05-10 RX ORDER — OMEPRAZOLE 20 MG/1
CAPSULE, DELAYED RELEASE ORAL
Qty: 90 CAPSULE | Refills: 1 | Status: SHIPPED | OUTPATIENT
Start: 2021-05-10 | End: 2021-11-08

## 2021-05-10 NOTE — TELEPHONE ENCOUNTER
Last visit: 12/1/20  Last Med refill: 11/13/20  Does patient have enough medication for 72 hours: Yes    Next Visit Date:  No future appointments.     Health Maintenance   Topic Date Due    Pneumococcal 0-64 years Vaccine (1 of 1 - PPSV23) Never done    COVID-19 Vaccine (1) Never done    DTaP/Tdap/Td vaccine (1 - Tdap) Never done    Annual Wellness Visit (AWV)  Never done    Shingles Vaccine (1 of 2) 12/01/2021 (Originally 5/17/2014)    Flu vaccine (Season Ended) 09/01/2021    A1C test (Diabetic or Prediabetic)  02/19/2022    Lipid screen  02/19/2022    Colon cancer screen colonoscopy  02/02/2027    Hepatitis C screen  Completed    HIV screen  Completed    Hepatitis A vaccine  Aged Out    Hepatitis B vaccine  Aged Out    Hib vaccine  Aged Out    Meningococcal (ACWY) vaccine  Aged Out       Hemoglobin A1C (%)   Date Value   02/19/2021 5.7   05/06/2019 5.7   01/19/2016 5.5             ( goal A1C is < 7)   No results found for: LABMICR  LDL Cholesterol (mg/dL)   Date Value   02/19/2021 96   07/25/2018 124       (goal LDL is <100)   AST (U/L)   Date Value   02/19/2021 46 (H)     ALT (U/L)   Date Value   02/19/2021 32     BUN (mg/dL)   Date Value   02/19/2021 18     BP Readings from Last 3 Encounters:   05/06/19 124/84   10/09/18 130/86   05/02/18 122/74          (goal 120/80)    All Future Testing planned in [facility]              Patient Active Problem List:     Gastroesophageal reflux disease     Erectile dysfunction     Mixed hyperlipidemia     Chronic pain of right ankle

## 2021-05-20 DIAGNOSIS — Z91.89 CANDIDATE FOR STATIN THERAPY DUE TO RISK OF FUTURE CARDIOVASCULAR EVENT: ICD-10-CM

## 2021-06-10 RX ORDER — SIMVASTATIN 20 MG
TABLET ORAL
Qty: 30 TABLET | Refills: 0 | Status: SHIPPED | OUTPATIENT
Start: 2021-06-10 | End: 2021-07-06 | Stop reason: SDUPTHER

## 2021-07-06 ENCOUNTER — VIRTUAL VISIT (OUTPATIENT)
Dept: FAMILY MEDICINE CLINIC | Age: 56
End: 2021-07-06
Payer: COMMERCIAL

## 2021-07-06 DIAGNOSIS — K21.9 GASTROESOPHAGEAL REFLUX DISEASE, UNSPECIFIED WHETHER ESOPHAGITIS PRESENT: Primary | ICD-10-CM

## 2021-07-06 DIAGNOSIS — Z91.89 CANDIDATE FOR STATIN THERAPY DUE TO RISK OF FUTURE CARDIOVASCULAR EVENT: ICD-10-CM

## 2021-07-06 PROCEDURE — 99213 OFFICE O/P EST LOW 20 MIN: CPT | Performed by: NURSE PRACTITIONER

## 2021-07-06 RX ORDER — SIMVASTATIN 20 MG
TABLET ORAL
Qty: 90 TABLET | Refills: 1 | Status: SHIPPED | OUTPATIENT
Start: 2021-07-06 | End: 2022-01-03

## 2021-07-06 SDOH — ECONOMIC STABILITY: FOOD INSECURITY: WITHIN THE PAST 12 MONTHS, THE FOOD YOU BOUGHT JUST DIDN'T LAST AND YOU DIDN'T HAVE MONEY TO GET MORE.: NEVER TRUE

## 2021-07-06 SDOH — ECONOMIC STABILITY: FOOD INSECURITY: WITHIN THE PAST 12 MONTHS, YOU WORRIED THAT YOUR FOOD WOULD RUN OUT BEFORE YOU GOT MONEY TO BUY MORE.: NEVER TRUE

## 2021-07-06 ASSESSMENT — PATIENT HEALTH QUESTIONNAIRE - PHQ9
SUM OF ALL RESPONSES TO PHQ QUESTIONS 1-9: 0
SUM OF ALL RESPONSES TO PHQ QUESTIONS 1-9: 0
1. LITTLE INTEREST OR PLEASURE IN DOING THINGS: 0
2. FEELING DOWN, DEPRESSED OR HOPELESS: 0
SUM OF ALL RESPONSES TO PHQ QUESTIONS 1-9: 0
SUM OF ALL RESPONSES TO PHQ9 QUESTIONS 1 & 2: 0

## 2021-07-06 ASSESSMENT — ENCOUNTER SYMPTOMS
COUGH: 0
SHORTNESS OF BREATH: 0

## 2021-07-06 ASSESSMENT — SOCIAL DETERMINANTS OF HEALTH (SDOH): HOW HARD IS IT FOR YOU TO PAY FOR THE VERY BASICS LIKE FOOD, HOUSING, MEDICAL CARE, AND HEATING?: NOT HARD AT ALL

## 2021-07-06 NOTE — PROGRESS NOTES
VISIT LOCATION: Home    TELEHEALTH EVALUATION -- Audio/Visual (During MHJTO-29 public health emergency)    Due to COVID 23 outbreak, patient's office visit was converted to a virtual visit. Patient was contacted and agreed to proceed with a virtual visit via [email or web address]  The risks and benefits of converting to a virtual visit were discussed in light of the current infectious disease epidemic. Patient also understood that insurance coverage and co-pays are up to their individual insurance plans. Kai Anguiano (:  1965) has requested an audio/video evaluation for the following concern(s):    Chief Complaint:   HPI:     Hyperlipidemia:  No new myalgias or GI upset on simvastatin (Zocor). Medication compliance: compliant all of the time. Patient is  following a low fat, low cholesterol diet. He is  exercising regularly. He is slowing down with smoking a little bit. 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 There is no mention of follow up, Brandan Mcdowell will call to find out. Review of Systems   Constitutional: Negative for chills and fever. Respiratory: Negative for cough and shortness of breath. Cardiovascular: Negative for chest pain and leg swelling. Prior to Visit Medications    Medication Sig Taking?  Authorizing Provider   simvastatin (ZOCOR) 20 MG tablet take 1 tablet by mouth at bedtime Yes EVANGELIST Car CNP   omeprazole (PRILOSEC) 20 MG delayed release capsule take 1 capsule by mouth every morning BEFORE BREAKFAST Yes EVANGELIST Car CNP     Social- patient smokes    Past Medical History:   Diagnosis Date    Depression     Erectile dysfunction 2016    Gastroesophageal reflux disease 2016    Mixed hyperlipidemia 2016   ,   Past Surgical History:   Procedure Laterality Date    FRACTURE SURGERY      ankle surgery             [] OTHER: Constitutional: [x] Appears well-developed and well-nourished [] No apparent distress                            [] Abnormal-   Mental status  [x] Alert and awake  [x] Oriented to person/place/time [x]Able to follow commands       Eyes:  EOM    [x]  Normal  [] Abnormal-  Sclera  [x]  Normal  [] Abnormal -         Discharge [x]  None visible  [] Abnormal -     HENT:   [x] Normocephalic, atraumatic. [] Abnormal   [] Mouth/Throat: Mucous membranes are moist.      External Ears [x] Normal  [] Abnormal-      Neck: [x] No visualized mass      Pulmonary/Chest: [x] Respiratory effort normal.  [] No visualized signs of difficulty breathing or respiratory distress        [] Abnormal-      Musculoskeletal:   [] Normal gait with no signs of ataxia         [x] Normal range of motion of neck        [] Abnormal-   [] Motor grossly intact in visible upper extremities    [] Motor grossly intact in visible lower extremities        Neurological:        [x] No Facial Asymmetry (Cranial nerve 7 motor function) (limited exam to video visit)                       [x] No gaze palsy        [] Abnormal-         Skin:                     [x] No significant exanthematous lesions or discoloration noted on facial skin         [] Abnormal-                                  Psychiatric:           [x] Normal Affect [] No Hallucinations        [] Abnormal- [] Normal Mood  [] Anxious appearing    [] Depressed appearing  [] Confused appearing      Due to this being a TeleHealth encounter, evaluation of the following organ systems is limited: Vitals/Constitutional/EENT/Resp/CV/GI//MS/Neuro/Skin/Heme-Lymph-Imm. ASSESSMENT/PLAN:  Encounter Diagnoses   Name Primary?     Gastroesophageal reflux disease, unspecified whether esophagitis present Yes    Candidate for statin therapy due to risk of future cardiovascular event        Orders Placed This Encounter   Medications    simvastatin (ZOCOR) 20 MG tablet     Sig: take 1 tablet by mouth at bedtime Dispense:  90 tablet     Refill:  1         No follow-ups on file. An  electronic signature was used to authenticate this note. --EVANGELIST Pressley - CNP on 7/6/2021 at 8:48 AM        Pursuant to the emergency declaration under the [address] [place] [address], [facility] waiver authority and the [employer] Act, this Virtual  Visit was conducted, with patient's consent, to reduce the patient's risk of exposure to COVID-19 and provide continuity of care for an established patient. Services were provided through a telephone discussion virtually to substitute for in-person clinic visit.

## 2021-11-08 RX ORDER — OMEPRAZOLE 20 MG/1
CAPSULE, DELAYED RELEASE ORAL
Qty: 90 CAPSULE | Refills: 1 | Status: SHIPPED | OUTPATIENT
Start: 2021-11-08 | End: 2022-05-02 | Stop reason: SDUPTHER

## 2021-11-08 NOTE — TELEPHONE ENCOUNTER
Last visit: 07/06/2021  Last Med refill: 08/11/2021  Does patient have enough medication for 72 hours: No:     Next Visit Date:  No future appointments.     Health Maintenance   Topic Date Due    Pneumococcal 0-64 years Vaccine (1 of 2 - PPSV23) Never done    COVID-19 Vaccine (1) Never done    DTaP/Tdap/Td vaccine (1 - Tdap) Never done    Annual Wellness Visit (AWV)  Never done    Flu vaccine (1) Never done    Shingles Vaccine (1 of 2) 12/01/2021 (Originally 5/17/2014)    A1C test (Diabetic or Prediabetic)  02/19/2022    Lipid screen  02/19/2022    Colon cancer screen colonoscopy  08/25/2024    Hepatitis C screen  Completed    HIV screen  Completed    Hepatitis A vaccine  Aged Out    Hepatitis B vaccine  Aged Out    Hib vaccine  Aged Out    Meningococcal (ACWY) vaccine  Aged Out       Hemoglobin A1C (%)   Date Value   02/19/2021 5.7   05/06/2019 5.7   01/19/2016 5.5             ( goal A1C is < 7)   No results found for: LABMICR  LDL Cholesterol (mg/dL)   Date Value   02/19/2021 96   07/25/2018 124       (goal LDL is <100)   AST (U/L)   Date Value   02/19/2021 46 (H)     ALT (U/L)   Date Value   02/19/2021 32     BUN (mg/dL)   Date Value   02/19/2021 18     BP Readings from Last 3 Encounters:   05/06/19 124/84   10/09/18 130/86   05/02/18 122/74          (goal 120/80)    All Future Testing planned in [facility]              Patient Active Problem List:     Gastroesophageal reflux disease     Erectile dysfunction     Mixed hyperlipidemia     Chronic pain of right ankle

## 2022-04-04 DIAGNOSIS — Z91.89 CANDIDATE FOR STATIN THERAPY DUE TO RISK OF FUTURE CARDIOVASCULAR EVENT: ICD-10-CM

## 2022-04-04 RX ORDER — SIMVASTATIN 20 MG
TABLET ORAL
Qty: 30 TABLET | Refills: 0 | Status: SHIPPED | OUTPATIENT
Start: 2022-04-04 | End: 2022-05-02 | Stop reason: SDUPTHER

## 2022-04-04 NOTE — TELEPHONE ENCOUNTER
Last visit: 07/06/2021  Last Med refill: 01/05/2022  Does patient have enough medication for 72 hours: No:     Next Visit Date:  Future Appointments   Date Time Provider Susan Balderas   5/2/2022  7:45 AM EVANGELIST Goldberg - CNP [facility] [address] Maintenance   Topic Date Due    COVID-19 Vaccine (1) Never done    Pneumococcal 0-64 years Vaccine (1 of 2 - PPSV23) Never done    DTaP/Tdap/Td vaccine (1 - Tdap) Never done    Shingles Vaccine (1 of 2) Never done    Annual Wellness Visit (AWV)  Never done    A1C test (Diabetic or Prediabetic)  02/19/2022    Lipid screen  02/19/2022    Depression Screen  07/06/2022    Flu vaccine (Season Ended) 09/01/2022    Colorectal Cancer Screen  08/25/2024    Hepatitis C screen  Completed    HIV screen  Completed    Hepatitis A vaccine  Aged Out    Hepatitis B vaccine  Aged Out    Hib vaccine  Aged Out    Meningococcal (ACWY) vaccine  Aged Out       Hemoglobin A1C (%)   Date Value   02/19/2021 5.7   05/06/2019 5.7   01/19/2016 5.5             ( goal A1C is < 7)   No results found for: LABMICR  LDL Cholesterol (mg/dL)   Date Value   02/19/2021 96   07/25/2018 124       (goal LDL is <100)   AST (U/L)   Date Value   02/19/2021 46 (H)     ALT (U/L)   Date Value   02/19/2021 32     BUN (mg/dL)   Date Value   02/19/2021 18     BP Readings from Last 3 Encounters:   05/06/19 124/84   10/09/18 130/86   05/02/18 122/74          (goal 120/80)    All Future Testing planned in [facility]              Patient Active Problem List:     Gastroesophageal reflux disease     Erectile dysfunction     Mixed hyperlipidemia     Chronic pain of right ankle

## 2022-05-02 ENCOUNTER — OFFICE VISIT (OUTPATIENT)
Dept: FAMILY MEDICINE CLINIC | Age: 57
End: 2022-05-02
Payer: COMMERCIAL

## 2022-05-02 VITALS
HEART RATE: 80 BPM | BODY MASS INDEX: 31.66 KG/M2 | OXYGEN SATURATION: 97 % | WEIGHT: 197 LBS | HEIGHT: 66 IN | DIASTOLIC BLOOD PRESSURE: 80 MMHG | SYSTOLIC BLOOD PRESSURE: 120 MMHG

## 2022-05-02 DIAGNOSIS — Z79.899 MEDICATION MANAGEMENT: ICD-10-CM

## 2022-05-02 DIAGNOSIS — Z91.89 CANDIDATE FOR STATIN THERAPY DUE TO RISK OF FUTURE CARDIOVASCULAR EVENT: ICD-10-CM

## 2022-05-02 DIAGNOSIS — E78.2 MIXED HYPERLIPIDEMIA: Primary | ICD-10-CM

## 2022-05-02 DIAGNOSIS — K21.9 GASTROESOPHAGEAL REFLUX DISEASE, UNSPECIFIED WHETHER ESOPHAGITIS PRESENT: ICD-10-CM

## 2022-05-02 DIAGNOSIS — Z86.39 HISTORY OF ELEVATED GLUCOSE: ICD-10-CM

## 2022-05-02 PROCEDURE — 99213 OFFICE O/P EST LOW 20 MIN: CPT | Performed by: NURSE PRACTITIONER

## 2022-05-02 RX ORDER — SIMVASTATIN 20 MG
TABLET ORAL
Qty: 90 TABLET | Refills: 1 | Status: SHIPPED | OUTPATIENT
Start: 2022-05-02 | End: 2022-10-26

## 2022-05-02 RX ORDER — OMEPRAZOLE 20 MG/1
CAPSULE, DELAYED RELEASE ORAL
Qty: 90 CAPSULE | Refills: 1 | Status: SHIPPED | OUTPATIENT
Start: 2022-05-02 | End: 2022-10-26

## 2022-05-02 ASSESSMENT — PATIENT HEALTH QUESTIONNAIRE - PHQ9
1. LITTLE INTEREST OR PLEASURE IN DOING THINGS: 0
SUM OF ALL RESPONSES TO PHQ9 QUESTIONS 1 & 2: 0
2. FEELING DOWN, DEPRESSED OR HOPELESS: 0
SUM OF ALL RESPONSES TO PHQ QUESTIONS 1-9: 0

## 2022-05-02 ASSESSMENT — ENCOUNTER SYMPTOMS
SHORTNESS OF BREATH: 0
COUGH: 0

## 2022-05-02 NOTE — PROGRESS NOTES
Swati Mcleod (:  1965) is a 61 y.o. male,Established patient, here for evaluation of the following chief complaint(s):  6 Month Follow-Up         ASSESSMENT/PLAN:  1. Mixed hyperlipidemia  Due for labs, will recheck. -     Lipid, Fasting; Future  2. History of elevated glucose  -     Hemoglobin A1C; Future  3. Candidate for statin therapy due to risk of future cardiovascular event  -     simvastatin (ZOCOR) 20 MG tablet; take 1 tablet by mouth at bedtime, Disp-90 tablet, R-1Normal  4. Medication management  -     CBC; Future  -     Comprehensive Metabolic Panel; Future  5. GERD  Controlled with med. No follow-ups on file. Subjective   SUBJECTIVE/OBJECTIVE:  HPI    Review of Systems   Constitutional: Negative for chills and fever. Respiratory: Negative for cough and shortness of breath. Cardiovascular: Negative for chest pain and leg swelling. Objective    Vitals:    22 0749   BP: 120/80   Pulse: 80   SpO2: 97%     Wt Readings from Last 3 Encounters:   22 197 lb (89.4 kg)   19 205 lb 9.6 oz (93.3 kg)   10/10/18 209 lb (94.8 kg)       Physical Exam  Constitutional:       General: He is not in acute distress. Appearance: He is well-developed. HENT:      Head: Normocephalic. Right Ear: External ear normal.      Left Ear: External ear normal.   Cardiovascular:      Rate and Rhythm: Normal rate and regular rhythm. Heart sounds: Normal heart sounds. Pulmonary:      Effort: Pulmonary effort is normal.      Breath sounds: Normal breath sounds. Musculoskeletal:         General: Normal range of motion. Skin:     General: Skin is warm and dry. Neurological:      Mental Status: He is alert and oriented to person, place, and time. An electronic signature was used to authenticate this note.     --Joselito Bansal, APRN - CNP

## 2022-05-19 ENCOUNTER — HOSPITAL ENCOUNTER (OUTPATIENT)
Age: 57
Setting detail: SPECIMEN
Discharge: HOME | End: 2022-05-19

## 2022-05-19 DIAGNOSIS — Z79.899 MEDICATION MANAGEMENT: ICD-10-CM

## 2022-05-19 DIAGNOSIS — E78.2 MIXED HYPERLIPIDEMIA: ICD-10-CM

## 2022-05-19 DIAGNOSIS — Z86.39 HISTORY OF ELEVATED GLUCOSE: ICD-10-CM

## 2022-05-19 LAB
ALBUMIN SERPL-MCNC: 4.2 G/DL (ref 3.5–5.2)
ALBUMIN/GLOBULIN RATIO: 1.6 (ref 1–2.5)
ALP BLD-CCNC: 90 U/L (ref 40–129)
ALT SERPL-CCNC: 17 U/L (ref 5–41)
ANION GAP SERPL CALCULATED.3IONS-SCNC: 11 MMOL/L (ref 9–17)
AST SERPL-CCNC: 20 U/L
BILIRUB SERPL-MCNC: 0.35 MG/DL (ref 0.3–1.2)
BUN BLDV-MCNC: 13 MG/DL (ref 6–20)
CALCIUM SERPL-MCNC: 9.3 MG/DL (ref 8.6–10.4)
CHLORIDE BLD-SCNC: 108 MMOL/L (ref 98–107)
CHOLESTEROL, FASTING: 157 MG/DL
CHOLESTEROL/HDL RATIO: 3.6
CO2: 24 MMOL/L (ref 20–31)
CREAT SERPL-MCNC: 0.93 MG/DL (ref 0.7–1.2)
ESTIMATED AVERAGE GLUCOSE: 111 MG/DL
GFR AFRICAN AMERICAN: >60 ML/MIN
GFR NON-AFRICAN AMERICAN: >60 ML/MIN
GFR SERPL CREATININE-BSD FRML MDRD: ABNORMAL ML/MIN/{1.73_M2}
GLUCOSE BLD-MCNC: 86 MG/DL (ref 70–99)
HBA1C MFR BLD: 5.5 % (ref 4–6)
HCT VFR BLD CALC: 46.6 % (ref 40.7–50.3)
HDLC SERPL-MCNC: 44 MG/DL
HEMOGLOBIN: 14.4 G/DL (ref 13–17)
LDL CHOLESTEROL: 97 MG/DL (ref 0–130)
MCH RBC QN AUTO: 24.5 PG (ref 25.2–33.5)
MCHC RBC AUTO-ENTMCNC: 30.9 G/DL (ref 28.4–34.8)
MCV RBC AUTO: 79.4 FL (ref 82.6–102.9)
NRBC AUTOMATED: 0 PER 100 WBC
PDW BLD-RTO: 17.6 % (ref 11.8–14.4)
PLATELET # BLD: 207 K/UL (ref 138–453)
PMV BLD AUTO: 11.7 FL (ref 8.1–13.5)
POTASSIUM SERPL-SCNC: 4.7 MMOL/L (ref 3.7–5.3)
RBC # BLD: 5.87 M/UL (ref 4.21–5.77)
SODIUM BLD-SCNC: 143 MMOL/L (ref 135–144)
TOTAL PROTEIN: 6.8 G/DL (ref 6.4–8.3)
TRIGLYCERIDE, FASTING: 78 MG/DL
WBC # BLD: 8.2 K/UL (ref 3.5–11.3)

## 2022-10-26 DIAGNOSIS — Z91.89 CANDIDATE FOR STATIN THERAPY DUE TO RISK OF FUTURE CARDIOVASCULAR EVENT: ICD-10-CM

## 2022-10-26 RX ORDER — OMEPRAZOLE 20 MG/1
CAPSULE, DELAYED RELEASE ORAL
Qty: 90 CAPSULE | Refills: 1 | Status: SHIPPED | OUTPATIENT
Start: 2022-10-26

## 2022-10-26 RX ORDER — SIMVASTATIN 20 MG
TABLET ORAL
Qty: 90 TABLET | Refills: 1 | Status: SHIPPED | OUTPATIENT
Start: 2022-10-26

## 2022-10-26 NOTE — TELEPHONE ENCOUNTER
Last visit: 05/02/2022  Last Med refill: 07/28/2022  Does patient have enough medication for 72 hours: No:     Next Visit Date:  No future appointments.     Health Maintenance   Topic Date Due    Pneumococcal 0-64 years Vaccine (1 - PCV) Never done    DTaP/Tdap/Td vaccine (1 - Tdap) Never done    Shingles vaccine (1 of 2) Never done    Annual Wellness Visit (AWV)  Never done    COVID-19 Vaccine (4 - Booster for Moderna series) 01/23/2022    Flu vaccine (1) Never done    Depression Screen  05/02/2023    Lipids  05/19/2023    Colorectal Cancer Screen  08/25/2024    Hepatitis C screen  Completed    HIV screen  Completed    Hepatitis A vaccine  Aged Out    Hib vaccine  Aged Out    Meningococcal (ACWY) vaccine  Aged Out       Hemoglobin A1C (%)   Date Value   05/19/2022 5.5   02/19/2021 5.7   05/06/2019 5.7             ( goal A1C is < 7)   No results found for: LABMICR  LDL Cholesterol (mg/dL)   Date Value   05/19/2022 97   02/19/2021 96       (goal LDL is <100)   AST (U/L)   Date Value   05/19/2022 20     ALT (U/L)   Date Value   05/19/2022 17     BUN (mg/dL)   Date Value   05/19/2022 13     BP Readings from Last 3 Encounters:   05/02/22 120/80   05/06/19 124/84   10/09/18 130/86          (goal 120/80)    All Future Testing planned in [facility]              Patient Active Problem List:     Gastroesophageal reflux disease     Erectile dysfunction     Mixed hyperlipidemia     Chronic pain of right ankle

## 2023-04-25 DIAGNOSIS — Z91.89 CANDIDATE FOR STATIN THERAPY DUE TO RISK OF FUTURE CARDIOVASCULAR EVENT: ICD-10-CM

## 2023-04-25 RX ORDER — SIMVASTATIN 20 MG
TABLET ORAL
Qty: 30 TABLET | Refills: 0 | Status: SHIPPED | OUTPATIENT
Start: 2023-04-25

## 2023-04-25 RX ORDER — OMEPRAZOLE 20 MG/1
CAPSULE, DELAYED RELEASE ORAL
Qty: 30 CAPSULE | Refills: 0 | Status: SHIPPED | OUTPATIENT
Start: 2023-04-25

## 2023-04-25 NOTE — TELEPHONE ENCOUNTER
Last visit: 5/2/22  Last Med refill: 10/26/22  Does patient have enough medication for 72 hours: No:     Next Visit Date:  Future Appointments   Date Time Provider Susan Balderas   5/19/2023 10:00 AM EVANGELIST Coronel CNP [facility] [address] Maintenance   Topic Date Due    Pneumococcal 0-64 years Vaccine (1 - PCV) Never done    DTaP/Tdap/Td vaccine (1 - Tdap) Never done    Shingles vaccine (1 of 2) Never done    Annual Wellness Visit (AWV)  Never done    COVID-19 Vaccine (4 - Booster for Moderna series) 01/23/2022    Depression Screen  05/02/2023    Lipids  05/19/2023    Flu vaccine (Season Ended) 08/01/2023    Colorectal Cancer Screen  08/25/2024    Hepatitis C screen  Completed    HIV screen  Completed    Hepatitis A vaccine  Aged Out    Hib vaccine  Aged Out    Meningococcal (ACWY) vaccine  Aged Out    A1C test (Diabetic or Prediabetic)  Discontinued    Prostate Specific Antigen (PSA) Screening or Monitoring  Discontinued       Hemoglobin A1C (%)   Date Value   05/19/2022 5.5   02/19/2021 5.7   05/06/2019 5.7             ( goal A1C is < 7)   No results found for: LABMICR  LDL Cholesterol (mg/dL)   Date Value   05/19/2022 97   02/19/2021 96       (goal LDL is <100)   AST (U/L)   Date Value   05/19/2022 20     ALT (U/L)   Date Value   05/19/2022 17     BUN (mg/dL)   Date Value   05/19/2022 13     BP Readings from Last 3 Encounters:   05/02/22 120/80   05/06/19 124/84   10/09/18 130/86          (goal 120/80)    All Future Testing planned in [facility]              Patient Active Problem List:     Gastroesophageal reflux disease     Erectile dysfunction     Mixed hyperlipidemia     Chronic pain of right ankle

## 2023-05-19 ENCOUNTER — OFFICE VISIT (OUTPATIENT)
Dept: FAMILY MEDICINE CLINIC | Age: 58
End: 2023-05-19

## 2023-05-19 VITALS
OXYGEN SATURATION: 97 % | DIASTOLIC BLOOD PRESSURE: 70 MMHG | WEIGHT: 201 LBS | HEART RATE: 77 BPM | BODY MASS INDEX: 32.3 KG/M2 | SYSTOLIC BLOOD PRESSURE: 110 MMHG | HEIGHT: 66 IN

## 2023-05-19 DIAGNOSIS — Z12.5 SCREENING FOR MALIGNANT NEOPLASM OF PROSTATE: ICD-10-CM

## 2023-05-19 DIAGNOSIS — Z91.89 CANDIDATE FOR STATIN THERAPY DUE TO RISK OF FUTURE CARDIOVASCULAR EVENT: ICD-10-CM

## 2023-05-19 DIAGNOSIS — Z13.220 SCREENING, LIPID: ICD-10-CM

## 2023-05-19 DIAGNOSIS — Z00.00 INITIAL MEDICARE ANNUAL WELLNESS VISIT: Primary | ICD-10-CM

## 2023-05-19 DIAGNOSIS — R73.03 PRE-DIABETES: ICD-10-CM

## 2023-05-19 DIAGNOSIS — Z79.899 MEDICATION MANAGEMENT: ICD-10-CM

## 2023-05-19 DIAGNOSIS — Z13.1 SCREENING FOR DIABETES MELLITUS: ICD-10-CM

## 2023-05-19 DIAGNOSIS — E78.2 MIXED HYPERLIPIDEMIA: ICD-10-CM

## 2023-05-19 RX ORDER — OMEPRAZOLE 20 MG/1
CAPSULE, DELAYED RELEASE ORAL
Qty: 90 CAPSULE | Refills: 1 | Status: SHIPPED | OUTPATIENT
Start: 2023-05-19

## 2023-05-19 RX ORDER — SIMVASTATIN 20 MG
20 TABLET ORAL NIGHTLY
Qty: 90 TABLET | Refills: 1 | Status: SHIPPED | OUTPATIENT
Start: 2023-05-19

## 2023-05-19 SDOH — ECONOMIC STABILITY: FOOD INSECURITY: WITHIN THE PAST 12 MONTHS, YOU WORRIED THAT YOUR FOOD WOULD RUN OUT BEFORE YOU GOT MONEY TO BUY MORE.: NEVER TRUE

## 2023-05-19 SDOH — ECONOMIC STABILITY: FOOD INSECURITY: WITHIN THE PAST 12 MONTHS, THE FOOD YOU BOUGHT JUST DIDN'T LAST AND YOU DIDN'T HAVE MONEY TO GET MORE.: NEVER TRUE

## 2023-05-19 SDOH — ECONOMIC STABILITY: INCOME INSECURITY: HOW HARD IS IT FOR YOU TO PAY FOR THE VERY BASICS LIKE FOOD, HOUSING, MEDICAL CARE, AND HEATING?: NOT HARD AT ALL

## 2023-05-19 SDOH — ECONOMIC STABILITY: HOUSING INSECURITY
IN THE LAST 12 MONTHS, WAS THERE A TIME WHEN YOU DID NOT HAVE A STEADY PLACE TO SLEEP OR SLEPT IN A SHELTER (INCLUDING NOW)?: NO

## 2023-05-19 ASSESSMENT — PATIENT HEALTH QUESTIONNAIRE - PHQ9
2. FEELING DOWN, DEPRESSED OR HOPELESS: 0
SUM OF ALL RESPONSES TO PHQ QUESTIONS 1-9: 0
SUM OF ALL RESPONSES TO PHQ QUESTIONS 1-9: 0
1. LITTLE INTEREST OR PLEASURE IN DOING THINGS: 0
SUM OF ALL RESPONSES TO PHQ QUESTIONS 1-9: 0
SUM OF ALL RESPONSES TO PHQ QUESTIONS 1-9: 0
SUM OF ALL RESPONSES TO PHQ9 QUESTIONS 1 & 2: 0

## 2023-05-19 ASSESSMENT — LIFESTYLE VARIABLES
HOW OFTEN DO YOU HAVE A DRINK CONTAINING ALCOHOL: MONTHLY OR LESS
HOW MANY STANDARD DRINKS CONTAINING ALCOHOL DO YOU HAVE ON A TYPICAL DAY: 1 OR 2

## 2023-05-19 NOTE — PROGRESS NOTES
Medicare Annual Wellness Visit    Kelsey North is here for Medicare AWV    Assessment & Plan   Initial Medicare annual wellness visit  Mixed hyperlipidemia  -     Lipid, Fasting; Future  Candidate for statin therapy due to risk of future cardiovascular event  -     simvastatin (ZOCOR) 20 MG tablet; Take 1 tablet by mouth nightly at bedtime. , Disp-90 tablet, R-1Normal  Screening for malignant neoplasm of prostate  -     PSA Screening; Future  Screening for diabetes mellitus  Screening, lipid  Medication management  -     CBC; Future  -     Comprehensive Metabolic Panel; Future  Pre-diabetes  -     Hemoglobin A1C; Future    Recommendations for Preventive Services Due: see orders and patient instructions/AVS.  Recommended screening schedule for the next 5-10 years is provided to the patient in written form: see Patient Instructions/AVS.     No follow-ups on file. Subjective       Patient's complete Health Risk Assessment and screening values have been reviewed and are found in Flowsheets. The following problems were reviewed today and where indicated follow up appointments were made and/or referrals ordered. Positive Risk Factor Screenings with Interventions:          Drug Use:          Interpretation:  1-2: Low level - Monitor, re-assess at a later date  3-5: Moderate level - Further Investigation  6-8: Substantial level - Intensive Assessment  9-10: Severe level - Intensive Assessment    Interventions:  Patient declined any further intervention or treatment          Weight and Activity:  Physical Activity: Sufficiently Active    Days of Exercise per Week: 4 days    Minutes of Exercise per Session: 40 min     On average, how many days per week do you engage in moderate to strenuous exercise (like a brisk walk)?: 4 days  Have you lost any weight without trying in the past 3 months?: No  Body mass index is 32.44 kg/m².  (!) Abnormal  Obesity Interventions:  Pt states he is working out, rides his bike

## 2023-06-01 ENCOUNTER — HOSPITAL ENCOUNTER (OUTPATIENT)
Age: 58
Setting detail: SPECIMEN
Discharge: HOME | End: 2023-06-01

## 2023-06-01 DIAGNOSIS — R73.03 PRE-DIABETES: ICD-10-CM

## 2023-06-01 DIAGNOSIS — Z12.5 SCREENING FOR MALIGNANT NEOPLASM OF PROSTATE: ICD-10-CM

## 2023-06-01 DIAGNOSIS — E78.2 MIXED HYPERLIPIDEMIA: ICD-10-CM

## 2023-06-01 DIAGNOSIS — Z79.899 MEDICATION MANAGEMENT: ICD-10-CM

## 2023-06-01 LAB
ALBUMIN SERPL-MCNC: 4 G/DL (ref 3.5–5.2)
ALBUMIN/GLOB SERPL: 1.4 {RATIO} (ref 1–2.5)
ALP SERPL-CCNC: 86 U/L (ref 40–129)
ALT SERPL-CCNC: 21 U/L (ref 5–41)
ANION GAP SERPL CALCULATED.3IONS-SCNC: 13 MMOL/L (ref 9–17)
AST SERPL-CCNC: 20 U/L
BILIRUB SERPL-MCNC: 0.4 MG/DL (ref 0.3–1.2)
BUN SERPL-MCNC: 14 MG/DL (ref 6–20)
CALCIUM SERPL-MCNC: 9.4 MG/DL (ref 8.6–10.4)
CHLORIDE SERPL-SCNC: 108 MMOL/L (ref 98–107)
CHOLEST SERPL-MCNC: 162 MG/DL
CHOLESTEROL/HDL RATIO: 3.9
CO2 SERPL-SCNC: 19 MMOL/L (ref 20–31)
CREAT SERPL-MCNC: 0.84 MG/DL (ref 0.7–1.2)
ERYTHROCYTE [DISTWIDTH] IN BLOOD BY AUTOMATED COUNT: 16 % (ref 11.8–14.4)
EST. AVERAGE GLUCOSE BLD GHB EST-MCNC: 117 MG/DL
GFR SERPL CREATININE-BSD FRML MDRD: >60 ML/MIN/1.73M2
GLUCOSE SERPL-MCNC: 111 MG/DL (ref 70–99)
HBA1C MFR BLD: 5.7 % (ref 4–6)
HCT VFR BLD AUTO: 46.6 % (ref 40.7–50.3)
HDLC SERPL-MCNC: 42 MG/DL
HGB BLD-MCNC: 14.9 G/DL (ref 13–17)
LDLC SERPL CALC-MCNC: 103 MG/DL (ref 0–130)
MCH RBC QN AUTO: 24.8 PG (ref 25.2–33.5)
MCHC RBC AUTO-ENTMCNC: 32 G/DL (ref 28.4–34.8)
MCV RBC AUTO: 77.5 FL (ref 82.6–102.9)
NRBC AUTOMATED: 0 PER 100 WBC
PLATELET # BLD AUTO: 208 K/UL (ref 138–453)
PMV BLD AUTO: 11.2 FL (ref 8.1–13.5)
POTASSIUM SERPL-SCNC: 4.6 MMOL/L (ref 3.7–5.3)
PROT SERPL-MCNC: 6.9 G/DL (ref 6.4–8.3)
RBC # BLD AUTO: 6.01 M/UL (ref 4.21–5.77)
SODIUM SERPL-SCNC: 140 MMOL/L (ref 135–144)
TRIGL SERPL-MCNC: 86 MG/DL
WBC OTHER # BLD: 8.5 K/UL (ref 3.5–11.3)

## 2023-06-02 LAB — PSA SERPL-MCNC: 1.42 NG/ML

## 2023-11-09 DIAGNOSIS — Z91.89 CANDIDATE FOR STATIN THERAPY DUE TO RISK OF FUTURE CARDIOVASCULAR EVENT: ICD-10-CM

## 2023-11-09 RX ORDER — OMEPRAZOLE 20 MG/1
CAPSULE, DELAYED RELEASE ORAL
Qty: 90 CAPSULE | Refills: 1 | Status: SHIPPED | OUTPATIENT
Start: 2023-11-09

## 2023-11-09 RX ORDER — SIMVASTATIN 20 MG
20 TABLET ORAL
Qty: 90 TABLET | Refills: 1 | Status: SHIPPED | OUTPATIENT
Start: 2023-11-09

## 2023-11-09 NOTE — TELEPHONE ENCOUNTER
Last visit: 5/19/23  Last Med refill: 5/19/23  Does patient have enough medication for 72 hours: Yes    Next Visit Date:  Future Appointments   Date Time Provider [address]   11/20/2023  9:00 AM EVANGELIST Collins CNP [facility]   5/20/2024  9:00 AM EVANGELIST Collins CNP [facility] [address] Maintenance   Topic Date Due    Hepatitis B vaccine (1 of 3 - 3-dose series) Never done    Shingles vaccine (1 of 2) Never done    Flu vaccine (1) Never done    COVID-19 Vaccine (4 - 2023-24 season) 09/01/2023    DTaP/Tdap/Td vaccine (1 - Tdap) 05/19/2024 (Originally 5/17/1983)    Pneumococcal 0-64 years Vaccine (1 - PCV) 05/19/2024 (Originally [birth year]/17/1970)    Depression Screen  05/19/2024    Annual Wellness Visit (AWV)  05/19/2024    A1C test (Diabetic or Prediabetic)  06/01/2024    Lipids  06/01/2024    Colorectal Cancer Screen  08/25/2024    Hepatitis C screen  Completed    HIV screen  Completed    Hepatitis A vaccine  Aged Out    Hib vaccine  Aged Out    Meningococcal (ACWY) vaccine  Aged Out    Prostate Specific Antigen (PSA) Screening or Monitoring  Discontinued       Hemoglobin A1C (%)   Date Value   06/01/2023 5.7   05/19/2022 5.5   02/19/2021 5.7             ( goal A1C is < 7)   No components found for: \"LABMICR\"  LDL Cholesterol (mg/dL)   Date Value   06/01/2023 103   05/19/2022 97       (goal LDL is <100)   AST (U/L)   Date Value   06/01/2023 20     ALT (U/L)   Date Value   06/01/2023 21     BUN (mg/dL)   Date Value   06/01/2023 14     BP Readings from Last 3 Encounters:   05/19/23 110/70   05/02/22 120/80   05/06/19 124/84          (goal 120/80)    All Future Testing planned in [facility]              Patient Active Problem List:     Gastroesophageal reflux disease     Erectile dysfunction     Mixed hyperlipidemia     Chronic pain of right ankle

## 2024-01-02 ENCOUNTER — OFFICE VISIT (OUTPATIENT)
Dept: FAMILY MEDICINE CLINIC | Age: 59
End: 2024-01-02
Payer: COMMERCIAL

## 2024-01-02 VITALS
WEIGHT: 210 LBS | DIASTOLIC BLOOD PRESSURE: 70 MMHG | BODY MASS INDEX: 33.89 KG/M2 | SYSTOLIC BLOOD PRESSURE: 118 MMHG | OXYGEN SATURATION: 99 %

## 2024-01-02 DIAGNOSIS — F17.200 SMOKES AND MOTIVATED TO QUIT: ICD-10-CM

## 2024-01-02 DIAGNOSIS — R73.03 PRE-DIABETES: Primary | ICD-10-CM

## 2024-01-02 DIAGNOSIS — E78.2 MIXED HYPERLIPIDEMIA: ICD-10-CM

## 2024-01-02 PROCEDURE — 99214 OFFICE O/P EST MOD 30 MIN: CPT | Performed by: NURSE PRACTITIONER

## 2024-01-02 ASSESSMENT — ENCOUNTER SYMPTOMS
COUGH: 0
SHORTNESS OF BREATH: 0

## 2024-01-02 ASSESSMENT — PATIENT HEALTH QUESTIONNAIRE - PHQ9
2. FEELING DOWN, DEPRESSED OR HOPELESS: 0
SUM OF ALL RESPONSES TO PHQ QUESTIONS 1-9: 0
SUM OF ALL RESPONSES TO PHQ QUESTIONS 1-9: 0
SUM OF ALL RESPONSES TO PHQ9 QUESTIONS 1 & 2: 0
1. LITTLE INTEREST OR PLEASURE IN DOING THINGS: 0
SUM OF ALL RESPONSES TO PHQ QUESTIONS 1-9: 0
SUM OF ALL RESPONSES TO PHQ QUESTIONS 1-9: 0

## 2024-01-02 NOTE — PROGRESS NOTES
Otis Palacio (:  1965) is a 58 y.o. male,Established patient, here for evaluation of the following chief complaint(s):  Hyperlipidemia         ASSESSMENT/PLAN:  1. Pre-diabetes  Working on dietary changes.     2. Mixed hyperlipidemia  3. Smokes and motivated to quit  Willing to try some gum, he and his spouse are  -     nicotine polacrilex (NICORETTE) 2 MG gum; Take 1 each by mouth as needed for Smoking cessation, Disp-60 each, R-3Normal      No follow-ups on file.     Acid reflux controlled on omeprazole    Subjective   SUBJECTIVE/OBJECTIVE:  Hyperlipidemia  Pertinent negatives include no chest pain or shortness of breath.       Review of Systems   Constitutional:  Negative for chills and fever.   Respiratory:  Negative for cough and shortness of breath.    Cardiovascular:  Negative for chest pain and leg swelling.          Objective   Vitals:    24 0826   BP: 118/70   SpO2: 99%     Physical Exam  Constitutional:       General: He is not in acute distress.     Appearance: He is well-developed.   HENT:      Head: Normocephalic.      Right Ear: External ear normal.      Left Ear: External ear normal.   Cardiovascular:      Rate and Rhythm: Normal rate and regular rhythm.      Heart sounds: Normal heart sounds.   Pulmonary:      Effort: Pulmonary effort is normal.      Breath sounds: Normal breath sounds.   Musculoskeletal:         General: Normal range of motion.   Skin:     General: Skin is warm and dry.   Neurological:      Mental Status: He is alert and oriented to person, place, and time.            An electronic signature was used to authenticate this note.    --EVANGELIST KELLY - CNP

## 2024-04-24 DIAGNOSIS — Z91.89 CANDIDATE FOR STATIN THERAPY DUE TO RISK OF FUTURE CARDIOVASCULAR EVENT: ICD-10-CM

## 2024-04-25 RX ORDER — SIMVASTATIN 20 MG
20 TABLET ORAL NIGHTLY
Qty: 90 TABLET | Refills: 1 | Status: SHIPPED | OUTPATIENT
Start: 2024-04-25

## 2024-04-25 RX ORDER — OMEPRAZOLE 20 MG/1
CAPSULE, DELAYED RELEASE ORAL
Qty: 90 CAPSULE | Refills: 1 | Status: SHIPPED | OUTPATIENT
Start: 2024-04-25

## 2024-05-17 SDOH — HEALTH STABILITY: PHYSICAL HEALTH: ON AVERAGE, HOW MANY DAYS PER WEEK DO YOU ENGAGE IN MODERATE TO STRENUOUS EXERCISE (LIKE A BRISK WALK)?: 3 DAYS

## 2024-05-17 SDOH — HEALTH STABILITY: PHYSICAL HEALTH: ON AVERAGE, HOW MANY MINUTES DO YOU ENGAGE IN EXERCISE AT THIS LEVEL?: 60 MIN

## 2024-05-17 ASSESSMENT — LIFESTYLE VARIABLES
HOW OFTEN DO YOU HAVE A DRINK CONTAINING ALCOHOL: 2-3 TIMES A WEEK
HOW OFTEN DO YOU HAVE A DRINK CONTAINING ALCOHOL: 4
HOW MANY STANDARD DRINKS CONTAINING ALCOHOL DO YOU HAVE ON A TYPICAL DAY: 1
HOW MANY STANDARD DRINKS CONTAINING ALCOHOL DO YOU HAVE ON A TYPICAL DAY: 1 OR 2
HOW OFTEN DO YOU HAVE SIX OR MORE DRINKS ON ONE OCCASION: 1

## 2024-05-17 ASSESSMENT — PATIENT HEALTH QUESTIONNAIRE - PHQ9
SUM OF ALL RESPONSES TO PHQ QUESTIONS 1-9: 0
2. FEELING DOWN, DEPRESSED OR HOPELESS: NOT AT ALL
SUM OF ALL RESPONSES TO PHQ9 QUESTIONS 1 & 2: 0
1. LITTLE INTEREST OR PLEASURE IN DOING THINGS: NOT AT ALL
SUM OF ALL RESPONSES TO PHQ QUESTIONS 1-9: 0

## 2024-05-20 ENCOUNTER — OFFICE VISIT (OUTPATIENT)
Dept: FAMILY MEDICINE CLINIC | Age: 59
End: 2024-05-20
Payer: COMMERCIAL

## 2024-05-20 VITALS
HEART RATE: 70 BPM | WEIGHT: 217 LBS | OXYGEN SATURATION: 97 % | DIASTOLIC BLOOD PRESSURE: 70 MMHG | SYSTOLIC BLOOD PRESSURE: 124 MMHG | HEIGHT: 66 IN | BODY MASS INDEX: 34.87 KG/M2

## 2024-05-20 DIAGNOSIS — Z00.00 MEDICARE ANNUAL WELLNESS VISIT, SUBSEQUENT: ICD-10-CM

## 2024-05-20 DIAGNOSIS — Z79.899 MEDICATION MANAGEMENT: ICD-10-CM

## 2024-05-20 DIAGNOSIS — Z86.39 HISTORY OF ELEVATED GLUCOSE: ICD-10-CM

## 2024-05-20 DIAGNOSIS — E78.2 MIXED HYPERLIPIDEMIA: Primary | ICD-10-CM

## 2024-05-20 DIAGNOSIS — E66.01 SEVERE OBESITY (BMI 35.0-39.9) WITH COMORBIDITY (HCC): ICD-10-CM

## 2024-05-20 PROCEDURE — G0439 PPPS, SUBSEQ VISIT: HCPCS | Performed by: NURSE PRACTITIONER

## 2024-05-20 SDOH — ECONOMIC STABILITY: FOOD INSECURITY: WITHIN THE PAST 12 MONTHS, YOU WORRIED THAT YOUR FOOD WOULD RUN OUT BEFORE YOU GOT MONEY TO BUY MORE.: NEVER TRUE

## 2024-05-20 SDOH — ECONOMIC STABILITY: FOOD INSECURITY: WITHIN THE PAST 12 MONTHS, THE FOOD YOU BOUGHT JUST DIDN'T LAST AND YOU DIDN'T HAVE MONEY TO GET MORE.: NEVER TRUE

## 2024-05-20 SDOH — ECONOMIC STABILITY: INCOME INSECURITY: HOW HARD IS IT FOR YOU TO PAY FOR THE VERY BASICS LIKE FOOD, HOUSING, MEDICAL CARE, AND HEATING?: NOT HARD AT ALL

## 2024-05-20 NOTE — PROGRESS NOTES
Medicare Annual Wellness Visit    Otis Palacio is here for Medicare AWV    Assessment & Plan   Mixed hyperlipidemia  History of elevated glucose    Recommendations for Preventive Services Due: see orders and patient instructions/AVS.  Recommended screening schedule for the next 5-10 years is provided to the patient in written form: see Patient Instructions/AVS.     No follow-ups on file.     Subjective       Patient's complete Health Risk Assessment and screening values have been reviewed and are found in Flowsheets. The following problems were reviewed today and where indicated follow up appointments were made and/or referrals ordered.    Positive Risk Factor Screenings with Interventions:          Drug Use:   Substance and Sexual Activity   Drug Use Yes    Frequency: 5.0 times per week    Types: Marijuana (Weed)      DAST-10 Score: 0   Interpretation:  1-2: Low level - Monitor, re-assess at a later date  3-5: Moderate level - Further Investigation  6-8: Substantial level - Intensive Assessment  9-10: Severe level - Intensive Assessment    Interventions:  Patient declined any further intervention or treatment         Activity, Diet, and Weight:  On average, how many days per week do you engage in moderate to strenuous exercise (like a brisk walk)?: 3 days  On average, how many minutes do you engage in exercise at this level?: 60 min    Do you eat balanced/healthy meals regularly?: Yes    Body mass index is 35.02 kg/m². (!) Abnormal      Obesity Interventions:  Patient declines any further evaluation or treatment      Wt Readings from Last 3 Encounters:   05/20/24 98.4 kg (217 lb)   01/02/24 95.3 kg (210 lb)   05/19/23 91.2 kg (201 lb)               Tobacco Use:  Tobacco Use: High Risk (1/2/2024)    Patient History     Smoking Tobacco Use: Every Day     Smokeless Tobacco Use: Never     Passive Exposure: Not on file     E-cigarette/Vaping       Questions Responses    E-cigarette/Vaping Use     Start Date

## 2024-05-20 NOTE — PATIENT INSTRUCTIONS
professionals who can help you achieve your weight loss goals.  A dietitian can help you make healthy changes in your diet.  An exercise specialist or  can help you develop a safe and effective exercise program.  A counselor or psychiatrist can help you cope with issues such as depression, anxiety, or family problems that can make it hard to focus on weight loss.  Consider joining a support group for people who are trying to lose weight. Your doctor can suggest groups in your area.  Where can you learn more?  Go to https://www.[employer].net/patientEd and enter U357 to learn more about \"Starting a Weight Loss Plan: Care Instructions.\"  Current as of: September 20, 2023               Content Version: 14.0  © 5565-1967 [employer].   Care instructions adapted under license by [employer]. If you have questions about a medical condition or this instruction, always ask your healthcare professional. [employer] disclaims any warranty or liability for your use of this information.           A Healthy Heart: Care Instructions  Overview     Coronary artery disease, also called heart disease, occurs when a substance called plaque builds up in the vessels that supply oxygen-rich blood to your heart muscle. This can narrow the blood vessels and reduce blood flow. A heart attack happens when blood flow is completely blocked. A high-fat diet, smoking, and other factors increase the risk of heart disease.  Your doctor has found that you have a chance of having heart disease. A heart-healthy lifestyle can help keep your heart healthy and prevent heart disease. This lifestyle includes eating healthy, being active, staying at a weight that's healthy for you, and not smoking or using tobacco. It also includes taking medicines as directed, managing other health conditions, and trying to get a healthy amount of sleep.  Follow-up care is a key part of your treatment and safety. Be sure to make

## 2024-05-23 ENCOUNTER — HOSPITAL ENCOUNTER (OUTPATIENT)
Age: 59
Setting detail: SPECIMEN
Discharge: HOME | End: 2024-05-23

## 2024-05-23 DIAGNOSIS — Z86.39 HISTORY OF ELEVATED GLUCOSE: ICD-10-CM

## 2024-05-23 DIAGNOSIS — E78.2 MIXED HYPERLIPIDEMIA: ICD-10-CM

## 2024-05-23 DIAGNOSIS — Z79.899 MEDICATION MANAGEMENT: ICD-10-CM

## 2024-05-23 LAB
ALBUMIN SERPL-MCNC: 4.3 G/DL (ref 3.5–5.2)
ALBUMIN/GLOB SERPL: 2 {RATIO} (ref 1–2.5)
ALP SERPL-CCNC: 85 U/L (ref 40–129)
ALT SERPL-CCNC: 19 U/L (ref 10–50)
ANION GAP SERPL CALCULATED.3IONS-SCNC: 12 MMOL/L (ref 9–16)
AST SERPL-CCNC: 32 U/L (ref 10–50)
BILIRUB SERPL-MCNC: 0.5 MG/DL (ref 0–1.2)
BUN SERPL-MCNC: 11 MG/DL (ref 8–23)
CALCIUM SERPL-MCNC: 9.2 MG/DL (ref 8.6–10.4)
CHLORIDE SERPL-SCNC: 106 MMOL/L (ref 98–107)
CHOLEST SERPL-MCNC: 156 MG/DL (ref 0–199)
CHOLESTEROL/HDL RATIO: 4
CO2 SERPL-SCNC: 22 MMOL/L (ref 20–31)
CREAT SERPL-MCNC: 1 MG/DL (ref 0.7–1.2)
ERYTHROCYTE [DISTWIDTH] IN BLOOD BY AUTOMATED COUNT: 18.7 % (ref 11.8–14.4)
EST. AVERAGE GLUCOSE BLD GHB EST-MCNC: 117 MG/DL
GFR, ESTIMATED: >90 ML/MIN/1.73M2
GLUCOSE SERPL-MCNC: 95 MG/DL (ref 74–99)
HBA1C MFR BLD: 5.7 % (ref 4–6)
HCT VFR BLD AUTO: 48.2 % (ref 40.7–50.3)
HDLC SERPL-MCNC: 45 MG/DL
HGB BLD-MCNC: 14.7 G/DL (ref 13–17)
LDLC SERPL CALC-MCNC: 85 MG/DL (ref 0–100)
MCH RBC QN AUTO: 24.5 PG (ref 25.2–33.5)
MCHC RBC AUTO-ENTMCNC: 30.5 G/DL (ref 28.4–34.8)
MCV RBC AUTO: 80.5 FL (ref 82.6–102.9)
NRBC BLD-RTO: 0 PER 100 WBC
PLATELET # BLD AUTO: 211 K/UL (ref 138–453)
PMV BLD AUTO: 11.3 FL (ref 8.1–13.5)
POTASSIUM SERPL-SCNC: 4.9 MMOL/L (ref 3.7–5.3)
PROT SERPL-MCNC: 7.1 G/DL (ref 6.6–8.7)
RBC # BLD AUTO: 5.99 M/UL (ref 4.21–5.77)
SODIUM SERPL-SCNC: 140 MMOL/L (ref 136–145)
TRIGL SERPL-MCNC: 134 MG/DL (ref 0–149)
VLDLC SERPL CALC-MCNC: 27 MG/DL
WBC OTHER # BLD: 9.1 K/UL (ref 3.5–11.3)

## 2024-08-19 DIAGNOSIS — Z91.89 CANDIDATE FOR STATIN THERAPY DUE TO RISK OF FUTURE CARDIOVASCULAR EVENT: ICD-10-CM

## 2024-08-19 RX ORDER — OMEPRAZOLE 20 MG/1
CAPSULE, DELAYED RELEASE ORAL
Qty: 90 CAPSULE | Refills: 1 | Status: SHIPPED | OUTPATIENT
Start: 2024-08-19

## 2024-08-19 RX ORDER — SIMVASTATIN 20 MG
20 TABLET ORAL NIGHTLY
Qty: 90 TABLET | Refills: 1 | Status: SHIPPED | OUTPATIENT
Start: 2024-08-19

## 2024-11-22 ENCOUNTER — OFFICE VISIT (OUTPATIENT)
Dept: FAMILY MEDICINE CLINIC | Age: 59
End: 2024-11-22

## 2024-11-22 VITALS
BODY MASS INDEX: 33.57 KG/M2 | WEIGHT: 208 LBS | OXYGEN SATURATION: 98 % | DIASTOLIC BLOOD PRESSURE: 80 MMHG | HEART RATE: 67 BPM | SYSTOLIC BLOOD PRESSURE: 120 MMHG

## 2024-11-22 DIAGNOSIS — K21.9 GASTROESOPHAGEAL REFLUX DISEASE, UNSPECIFIED WHETHER ESOPHAGITIS PRESENT: ICD-10-CM

## 2024-11-22 DIAGNOSIS — M25.571 CHRONIC PAIN OF RIGHT ANKLE: Primary | ICD-10-CM

## 2024-11-22 DIAGNOSIS — Z12.12 ENCOUNTER FOR SCREENING FOR COLORECTAL MALIGNANT NEOPLASM: ICD-10-CM

## 2024-11-22 DIAGNOSIS — G89.29 CHRONIC PAIN OF RIGHT ANKLE: Primary | ICD-10-CM

## 2024-11-22 DIAGNOSIS — Z12.11 ENCOUNTER FOR SCREENING FOR COLORECTAL MALIGNANT NEOPLASM: ICD-10-CM

## 2024-11-22 RX ORDER — FAMOTIDINE 20 MG/1
20 TABLET, FILM COATED ORAL DAILY PRN
Qty: 30 TABLET | Refills: 3 | Status: SHIPPED | OUTPATIENT
Start: 2024-11-22

## 2024-11-22 ASSESSMENT — ENCOUNTER SYMPTOMS
COUGH: 0
SHORTNESS OF BREATH: 0

## 2024-11-22 NOTE — PROGRESS NOTES
Otis Palacio (:  1965) is a 59 y.o. male,Established patient, here for evaluation of the following chief complaint(s):  6 Month Follow-Up and Gastroesophageal Reflux (Would like to discuss a different medication/States he read that omeprazole can cause cancer, he would like to switch to famotidine )         Assessment & Plan  Chronic pain of right ankle       Orders:    Handicap Placard [facility]; by Does not apply route 2029    Encounter for screening for colorectal malignant neoplasm       Orders:    Danny Landon MD, Gastroenterology, [facility]    Gastroesophageal reflux disease, unspecified whether esophagitis present   Chronic, at goal (stable), will switch daily med to prn.            No follow-ups on file.       Subjective   Gastroesophageal Reflux  He reports no chest pain or no coughing.     Slowing down on smoking.   Reminded about colonoscopy.       Review of Systems   Constitutional:  Negative for chills and fever.   Respiratory:  Negative for cough and shortness of breath.    Cardiovascular:  Negative for chest pain and leg swelling.     Objective   Vitals:    24 0835   BP: 120/80   Pulse: 67   SpO2: 98%     Wt Readings from Last 3 Encounters:   24 94.3 kg (208 lb)   24 98.4 kg (217 lb)   24 95.3 kg (210 lb)       Physical Exam  Constitutional:       General: He is not in acute distress.     Appearance: He is well-developed.   HENT:      Head: Normocephalic.      Right Ear: External ear normal.      Left Ear: External ear normal.   Cardiovascular:      Rate and Rhythm: Normal rate and regular rhythm.      Heart sounds: Normal heart sounds.   Pulmonary:      Effort: Pulmonary effort is normal.      Breath sounds: Normal breath sounds.   Musculoskeletal:         General: Normal range of motion.   Skin:     General: Skin is warm and dry.   Neurological:      Mental Status: He is alert and oriented to person, place, and time.           An electronic

## 2024-11-25 NOTE — TELEPHONE ENCOUNTER
Patient called in to schedule from referral, screening for colorectal malignant neoplasm . Please call to discuss    Thank you

## 2024-11-26 ENCOUNTER — PREP FOR PROCEDURE (OUTPATIENT)
Dept: GASTROENTEROLOGY | Age: 59
End: 2024-11-26

## 2024-11-26 DIAGNOSIS — Z12.11 SCREENING FOR COLON CANCER: ICD-10-CM

## 2024-11-26 RX ORDER — SODIUM, POTASSIUM,MAG SULFATES 17.5-3.13G
SOLUTION, RECONSTITUTED, ORAL ORAL
Qty: 177 ML | Refills: 0 | Status: SHIPPED | OUTPATIENT
Start: 2024-11-26

## 2024-11-26 NOTE — TELEPHONE ENCOUNTER
Procedure scheduled/Dr Campbell  Procedure: colon   Dx: screening  Date: 2/25/25   Time: 945am arrival 745am   Hospital: [facility] phone call: tbd  Bowel Prep instructions given: suprep   In office/via phone: phone   Clearance needed: none

## 2024-12-26 PROBLEM — Z12.11 SCREENING FOR COLON CANCER: Status: RESOLVED | Noted: 2024-11-26 | Resolved: 2024-12-26

## 2025-02-20 ENCOUNTER — HOSPITAL ENCOUNTER (OUTPATIENT)
Dept: PREADMISSION TESTING | Age: 60
Discharge: HOME | End: 2025-02-24

## 2025-02-20 VITALS — WEIGHT: 208 LBS | BODY MASS INDEX: 33.43 KG/M2 | HEIGHT: 66 IN

## 2025-02-20 NOTE — PROGRESS NOTES
Pre-op Instructions For Out-Patient Endoscopy Surgery    Medication Instructions:  Please stop herbs and any supplements now (includes vitamins and minerals).    For these medications:  Dulaglutide (Trulicity), Exenatide (Byetta and Bydureon, Liraglutide (Victoza), Lixisenatide (Adlyxin), Semaglutide (Ozempic and Rybelsus), Tirzepatide (Mounjaro, Zepbound)- Stop 1 week prior if taking weekly or 1 day prior if taking every 12 hours or daily.     Please contact your surgeon and prescribing physician for pre-op instructions for any blood thinners.    If you have inhalers/aerosol treatments at home, please use them the morning of your surgery and bring the inhalers with you to the hospital.    Please take the following medications the morning of your surgery with a sip of water:    None    Surgery Instructions:  After midnight before surgery:  Do not eat or drink anything, including water, mints, gum, and hard candy.  You may brush your teeth without swallowing.  No smoking, chewing tobacco, or street drugs.     ** Please Follow Bowel Prep instructions if given by surgeon's office**    Please shower or bathe before surgery.       Please do not wear any cologne, lotion, powder, jewelry, piercings, perfume, makeup, nail polish, hair accessories, or hair spray on the day of surgery.  Wear loose comfortable clothing.    Leave your valuables at home but bring a payment source for any after-surgery prescriptions you plan to fill at [facility].  Bring a storage case for any glasses/contacts.    An adult who is responsible for you MUST drive you home and should be with you for the first 24 hours after surgery.     The Day of Surgery:  Arrive at [facility] Entr[facility] at the time directed by your surgeon and check in at the desk.     If you have a living will or healthcare power of , please bring a copy.    You will be taken to the pre-op holding area where you will be prepared for surgery.

## 2025-02-21 NOTE — PRE-PROCEDURE INSTRUCTIONS
Have you received your Prep? Any questions with prep instructions?  Only Clear Liquid Diet day before.  Nothing to eat after midnight day before procedure.  Are you taking any blood thinners? If so, you need to Stop.  Remove any jewelry and body piercings.  Do you wear glasses? If so, please bring a case to store them in.  Are you having any Covid symptoms?  Do you have any new rashes, infections, etc. that we should be aware of?  Do you have a ride home the day of surgery? It cannot be a cab or medical transportation.  Verify surgery time/date and what time to arrive at hospital.   Left message regarding arrival time, procedure time, clear liquid and prep on Monday, need for , pre op phone number for any questions.

## 2025-02-24 ENCOUNTER — ANESTHESIA EVENT (OUTPATIENT)
Dept: ENDOSCOPY | Age: 60
End: 2025-02-24
Payer: COMMERCIAL

## 2025-02-25 ENCOUNTER — ANESTHESIA (OUTPATIENT)
Dept: ENDOSCOPY | Age: 60
End: 2025-02-25
Payer: COMMERCIAL

## 2025-02-25 ENCOUNTER — HOSPITAL ENCOUNTER (OUTPATIENT)
Age: 60
Setting detail: OUTPATIENT SURGERY
Discharge: HOME | End: 2025-02-25
Attending: STUDENT IN AN ORGANIZED HEALTH CARE EDUCATION/TRAINING PROGRAM | Admitting: STUDENT IN AN ORGANIZED HEALTH CARE EDUCATION/TRAINING PROGRAM
Payer: COMMERCIAL

## 2025-02-25 VITALS
HEIGHT: 66 IN | SYSTOLIC BLOOD PRESSURE: 133 MMHG | DIASTOLIC BLOOD PRESSURE: 114 MMHG | HEART RATE: 58 BPM | TEMPERATURE: 97.8 F | BODY MASS INDEX: 33.43 KG/M2 | OXYGEN SATURATION: 98 % | WEIGHT: 208 LBS | RESPIRATION RATE: 15 BRPM

## 2025-02-25 DIAGNOSIS — Z12.11 SCREENING FOR COLON CANCER: ICD-10-CM

## 2025-02-25 PROCEDURE — 6360000002 HC RX W HCPCS: Performed by: NURSE ANESTHETIST, CERTIFIED REGISTERED

## 2025-02-25 PROCEDURE — 3700000001 HC ADD 15 MINUTES (ANESTHESIA): Performed by: STUDENT IN AN ORGANIZED HEALTH CARE EDUCATION/TRAINING PROGRAM

## 2025-02-25 PROCEDURE — 7100000011 HC PHASE II RECOVERY - ADDTL 15 MIN: Performed by: STUDENT IN AN ORGANIZED HEALTH CARE EDUCATION/TRAINING PROGRAM

## 2025-02-25 PROCEDURE — 3609010600 HC COLONOSCOPY POLYPECTOMY SNARE/COLD BIOPSY: Performed by: STUDENT IN AN ORGANIZED HEALTH CARE EDUCATION/TRAINING PROGRAM

## 2025-02-25 PROCEDURE — 88305 TISSUE EXAM BY PATHOLOGIST: CPT

## 2025-02-25 PROCEDURE — 6360000002 HC RX W HCPCS: Performed by: ANESTHESIOLOGY

## 2025-02-25 PROCEDURE — 7100000010 HC PHASE II RECOVERY - FIRST 15 MIN: Performed by: STUDENT IN AN ORGANIZED HEALTH CARE EDUCATION/TRAINING PROGRAM

## 2025-02-25 PROCEDURE — 3700000000 HC ANESTHESIA ATTENDED CARE: Performed by: STUDENT IN AN ORGANIZED HEALTH CARE EDUCATION/TRAINING PROGRAM

## 2025-02-25 PROCEDURE — 2580000003 HC RX 258: Performed by: ANESTHESIOLOGY

## 2025-02-25 PROCEDURE — 2709999900 HC NON-CHARGEABLE SUPPLY: Performed by: STUDENT IN AN ORGANIZED HEALTH CARE EDUCATION/TRAINING PROGRAM

## 2025-02-25 RX ORDER — SODIUM CHLORIDE 0.9 % (FLUSH) 0.9 %
5-40 SYRINGE (ML) INJECTION EVERY 12 HOURS SCHEDULED
Status: DISCONTINUED | OUTPATIENT
Start: 2025-02-25 | End: 2025-02-25 | Stop reason: HOSPADM

## 2025-02-25 RX ORDER — SODIUM CHLORIDE, SODIUM LACTATE, POTASSIUM CHLORIDE, CALCIUM CHLORIDE 600; 310; 30; 20 MG/100ML; MG/100ML; MG/100ML; MG/100ML
INJECTION, SOLUTION INTRAVENOUS CONTINUOUS
Status: DISCONTINUED | OUTPATIENT
Start: 2025-02-25 | End: 2025-02-25 | Stop reason: HOSPADM

## 2025-02-25 RX ORDER — PROPOFOL 10 MG/ML
INJECTION, EMULSION INTRAVENOUS
Status: DISCONTINUED | OUTPATIENT
Start: 2025-02-25 | End: 2025-02-25 | Stop reason: SDUPTHER

## 2025-02-25 RX ORDER — SODIUM CHLORIDE 9 MG/ML
INJECTION, SOLUTION INTRAVENOUS PRN
Status: DISCONTINUED | OUTPATIENT
Start: 2025-02-25 | End: 2025-02-25 | Stop reason: HOSPADM

## 2025-02-25 RX ORDER — SODIUM CHLORIDE 0.9 % (FLUSH) 0.9 %
5-40 SYRINGE (ML) INJECTION PRN
Status: DISCONTINUED | OUTPATIENT
Start: 2025-02-25 | End: 2025-02-25 | Stop reason: HOSPADM

## 2025-02-25 RX ORDER — LIDOCAINE HYDROCHLORIDE 20 MG/ML
INJECTION, SOLUTION EPIDURAL; INFILTRATION; INTRACAUDAL; PERINEURAL
Status: DISCONTINUED | OUTPATIENT
Start: 2025-02-25 | End: 2025-02-25 | Stop reason: SDUPTHER

## 2025-02-25 RX ORDER — LIDOCAINE HYDROCHLORIDE 10 MG/ML
1 INJECTION, SOLUTION EPIDURAL; INFILTRATION; INTRACAUDAL; PERINEURAL
Status: COMPLETED | OUTPATIENT
Start: 2025-02-25 | End: 2025-02-25

## 2025-02-25 RX ADMIN — SODIUM CHLORIDE, POTASSIUM CHLORIDE, SODIUM LACTATE AND CALCIUM CHLORIDE: 600; 310; 30; 20 INJECTION, SOLUTION INTRAVENOUS at 08:39

## 2025-02-25 RX ADMIN — LIDOCAINE HYDROCHLORIDE 1 ML: 10 INJECTION, SOLUTION EPIDURAL; INFILTRATION; INTRACAUDAL; PERINEURAL at 08:39

## 2025-02-25 RX ADMIN — PROPOFOL 130 MCG/KG/MIN: 10 INJECTION, EMULSION INTRAVENOUS at 09:47

## 2025-02-25 RX ADMIN — LIDOCAINE HYDROCHLORIDE 60 MG: 20 INJECTION, SOLUTION EPIDURAL; INFILTRATION; INTRACAUDAL; PERINEURAL at 09:49

## 2025-02-25 RX ADMIN — LIDOCAINE HYDROCHLORIDE 50 MG: 20 INJECTION, SOLUTION EPIDURAL; INFILTRATION; INTRACAUDAL; PERINEURAL at 10:03

## 2025-02-25 RX ADMIN — PROPOFOL 80 MG: 10 INJECTION, EMULSION INTRAVENOUS at 09:49

## 2025-02-25 ASSESSMENT — ENCOUNTER SYMPTOMS
COUGH: 0
SHORTNESS OF BREATH: 0
SORE THROAT: 0

## 2025-02-25 ASSESSMENT — PAIN - FUNCTIONAL ASSESSMENT
PAIN_FUNCTIONAL_ASSESSMENT: 0-10
PAIN_FUNCTIONAL_ASSESSMENT: 0-10

## 2025-02-25 NOTE — DISCHARGE INSTRUCTIONS
Precautions to avoid constipation  Topical hemorrhoidal care  Avoid NSAIDs and smoking            Sedation or General Anesthesia, Adult  Care After  Refer to this sheet in the next 24 hours. These instructions provide you with information on caring for yourself after your procedure. Your caregiver may also give you more specific instructions. Your treatment has been planned according to current medical practices, but problems sometimes occur. Call your caregiver if you have any problems or questions after your procedure.   HOME CARE INSTRUCTIONS   Do not participate in any activities that require you to be alert or coordinated. Do not:  Drive.  Swim.  Ride a bicycle.  Operate heavy machinery.  Cook.  Use power tools.  Climb ladders.  Work at heights.  Take a bath.  Do not drink alcohol.  Do not make any important decisions or sign legal documents.  Stay with an adult.  The first meal following your procedure should be light and small. Avoid solid foods if you feel sick to your stomach (nauseous) or if you throw up (vomit).  Drink enough fluids to keep your urine clear or pale yellow.  Only take your usual medicines or new medicines if your caregiver approves them.  Only take over-the-counter or prescription medicines for pain, discomfort, or fever as directed by your caregiver.  Keep all follow-up appointments as directed by your caregiver.  SEEK IMMEDIATE MEDICAL CARE IF:   You are not feeling normal or behaving normally after 24 hours.  You have persistent nausea and vomiting.  You are unable to drink fluids or eat food.  You have difficulty urinating.  You have difficulty breathing or speaking.  You have blue or gray skin.  There is difficulty waking or you cannot be woken up.  You have heavy bleeding, redness, or a lot of swelling where the sedative or anesthesia entered your skin (intravenous site).  You have a rash.  MAKE SURE YOU:  Understand these instructions.  Will watch your condition.  Will get help

## 2025-02-25 NOTE — ANESTHESIA PRE PROCEDURE
Department of Anesthesiology  Preprocedure Note       Name:  Otis Palacio   Age:  59 y.o.  :  1965                                          MRN:  [id number]         Date:  2025      Surgeon: Surgeon(s):  Danny Campbell MD    Procedure: Procedure(s):  COLORECTAL CANCER SCREENING, NOT HIGH RISK    Medications prior to admission:   Prior to Admission medications    Medication Sig Start Date End Date Taking? Authorizing Provider   sodium-potassium-mag sulfate (SUPREP) 17.5-3.13-1.6 GM/177ML SOLN solution Please follow instructions given to you by your provider. 24   Danny Campbell MD   Handicap Placard MISC by Does not apply route 24   Yulia Sánchez APRN - CNP   famotidine (PEPCID) 20 MG tablet Take 1 tablet by mouth daily as needed (heart burn) 24   Yulia Sánchez APRN - CNP   simvastatin (ZOCOR) 20 MG tablet Take 1 tablet by mouth nightly 24   Yulia Sánchez APRN - CNP       Current medications:    Current Facility-Administered Medications   Medication Dose Route Frequency Provider Last Rate Last Admin   • lidocaine PF 1 % injection 1 mL  1 mL IntraDERmal Once PRN Quynh Nelson MD       • lactated ringers infusion   IntraVENous Continuous Quynh Nelson MD       • sodium chloride flush 0.9 % injection 5-40 mL  5-40 mL IntraVENous 2 times per day Quynh Nelson MD       • sodium chloride flush 0.9 % injection 5-40 mL  5-40 mL IntraVENous PRN Quynh Nelson MD       • 0.9 % sodium chloride infusion   IntraVENous PRN Quynh Nelson MD           Allergies:  No Known Allergies    Problem List:    Patient Active Problem List   Diagnosis Code   • Gastroesophageal reflux disease K21.9   • Erectile dysfunction N52.9   • Mixed hyperlipidemia E78.2   • Chronic pain of right ankle M25.571, G89.29       Past Medical History:        Diagnosis Date   • Depression    • Erectile dysfunction 2016   • Gastroesophageal reflux disease 2016   • Mixed

## 2025-02-25 NOTE — OP NOTE
COLONOSCOPY    DATE OF PROCEDURE: 2/25/2025    SURGEON: Danny Campbell MD  Facility : [facility]  ASSISTANT: None  PREOPERATIVE DIAGNOSIS: Screening for colorectal cancer    POSTOPERATIVE DIAGNOSIS: as described below    OPERATION: Total colonoscopy with biopsy and snare polypectomy    ANESTHESIA: Monitored Anesthesia Care (MAC)    ESTIMATED BLOOD LOSS: less than 50 cc    COMPLICATIONS: None.     SPECIMENS:  Was Obtained:     ID Type Source Tests Collected by Time Destination   A : Right Colon biopsy Tissue Colon SURGICAL PATHOLOGY Danny Campbell MD 2/25/2025 1006    B : Colon-Transverse Polyp Tissue Colon-Transverse SURGICAL PATHOLOGY Danny Campbell MD 2/25/2025 1012    C : Sigmoid Colon inflamation biopsy Tissue Sigmoid Colon SURGICAL PATHOLOGY Danny Campbell MD 2/25/2025 1015         HISTORY: The patient is a 59 y.o. year old male with history of above preop diagnosis.  I recommended colonoscopy with possible biopsy or polypectomy and I explained the risk, benefits, expected outcome, and alternatives to the procedure.  Risks included but are not limited to medication allergy, medication reaction, cardiovascular and respiratory problems, bleeding, perforation, infection, and/or missed diagnosis.  The patient understands and is in agreement.        PROCEDURE: Following arrival in the endoscopy room, the patient was placed in the left lateral decubitus position and final time-out accomplished in the presence of the nursing staff. Baseline vital signs were obtained and reviewed. The patient was given IV Monitored Anesthesia Care (MAC) and vitals monitoring per anesthesia department.     Digital rectal exam- abnormal: Small hemorrhoids    The colonoscope was inserted per rectum and advanced under direct vision to the cecum without difficulty.  Photodocumentation of the maximal extent reached (cecum, appendiceal orifice, ileocecal valve, and terminal ileum if indicated) and other findings was

## 2025-02-25 NOTE — H&P
HISTORY and PHYSICAL  [facility]       NAME:  Otis Palacio  MRN: [id number]   YOB: 1965   Date: 2/25/2025   Age: 59 y.o.  Gender: male       COMPLAINT AND PRESENT HISTORY:       Otis Palacio is 59 y.o.  male, here for     Procedure(s):  COLORECTAL CANCER SCREENING, NOT HIGH RISK    Pre-Op Diagnosis Codes:      * Screening for colon cancer [Z12.11]    Pt has had previous colonoscopy. Reports history of colon polyps.   Denies abdominal pain, dysphagia.  Hx of GERD. Takes pepcid.   Denies nausea, vomiting.  Denies diarrhea, constipation.  Denies blood in stool, dark tarry stools.  Denies changes in appetite and unintended weight loss.  Denies family history of colon cancer.    Completed and followed prescribed prep. NPO p MN. Denies taking any blood thinning medications. Denies recent or current chest pain/pressure, palpitations, SOB, recent URI, fever or chills.       RECENT LABS, IMAGING AND TESTING     Lab Results   Component Value Date    WBC 9.1 05/23/2024    RBC 5.99 (H) 05/23/2024    HGB 14.7 05/23/2024    HCT 48.2 05/23/2024    MCV 80.5 (L) 05/23/2024    MCH 24.5 (L) 05/23/2024    MCHC 30.5 05/23/2024    RDW 18.7 (H) 05/23/2024     05/23/2024    MPV 11.3 05/23/2024        Lab Results   Component Value Date     05/23/2024    K 4.9 05/23/2024     05/23/2024    CO2 22 05/23/2024    BUN 11 05/23/2024    CREATININE 1.0 05/23/2024    GLUCOSE 95 05/23/2024    CALCIUM 9.2 05/23/2024    BILITOT 0.5 05/23/2024    ALKPHOS 85 05/23/2024    AST 32 05/23/2024    ALT 19 05/23/2024         PAST MEDICAL HISTORY     Past Medical History:   Diagnosis Date    Depression     Erectile dysfunction 1/21/2016    Gastroesophageal reflux disease 1/21/2016    Mixed hyperlipidemia 1/21/2016       SURGICAL HISTORY       Past Surgical History:   Procedure Laterality Date    FRACTURE SURGERY  2005    ankle surgery       FAMILY HISTORY       Family History   Problem Relation Age of

## 2025-02-25 NOTE — ANESTHESIA POSTPROCEDURE EVALUATION
Department of Anesthesiology  Postprocedure Note    Patient: Otis Palacio  MRN: [id number]  YOB: 1965  Date of evaluation: 2/25/2025    Procedure Summary       Date: 02/25/25 Room / Location: [place] [address] / [facility]    Anesthesia Start: 0946 Anesthesia Stop: 1025    Procedure: COLONOSCOPY POLYPECTOMY SNARE/BIOPSY Diagnosis:       Screening for colon cancer      (Screening for colon cancer [Z12.11])    Surgeons: Danny Campbell MD Responsible Provider:     Anesthesia Type: general, TIVA ASA Status: 2            Anesthesia Type: No value filed.    Maite Phase I: Maite Score: 10    Maite Phase II: Maite Score: 6    Anesthesia Post Evaluation    Patient location during evaluation: PACU  Patient participation: complete - patient participated  Level of consciousness: awake and alert  Airway patency: patent  Nausea & Vomiting: no vomiting  Cardiovascular status: hemodynamically stable  Respiratory status: acceptable  Hydration status: euvolemic  Comments: POST- ANESTHESIA EVALUATION       Pt Name: Otis Palacio  MRN: [id number]  YOB: 1965  Date of evaluation: 2/25/2025  Time:  10:41 AM      BP (!) 144/91   Pulse 67   Temp 96.9 °F (36.1 °C) (Infrared)   Resp 17   Ht 1.676 m (5' 6\")   Wt 94.3 kg (208 lb)   SpO2 100%   BMI 33.57 kg/m²      Consciousness Level  Awake  Cardiopulmonary Status  Stable  Pain Adequately Treated YES  Nausea / Vomiting  NO  Adequate Hydration  YES  Anesthesia Related Complications NONE      Electronically signed by Andrea Shen MD on 2/25/2025 at 10:41 AM         Pain management: satisfactory to patient    No notable events documented.

## 2025-02-25 NOTE — DISCHARGE INSTR - DIET
Good nutrition is important when healing from an illness, injury, or surgery.  Follow any nutrition recommendations given to you during your hospital stay.   If you were given an oral nutrition supplement while in the hospital, continue to take this supplement at home.  You can take it with meals, in-between meals, and/or before bedtime. These supplements can be purchased at most local grocery stores, pharmacies, and chain [employer]-stores.   If you have any questions about your diet or nutrition, call the hospital and ask for the dietitian.  High-Fiber Diet     What Is Fiber?   Dietary fiber is a form of carbohydrate found in plants that cannot be digested by humans. All plants contain fiber, including fruits, vegetables, grains, and legumes. Fiber is often classified into two categories: soluble and insoluble.   Soluble fiber draws water into the bowel and can help slow digestion. Examples of foods that are high in soluble fiber include oatmeal, oat bran, barley, legumes (eg, beans and peas), apples, and strawberries.   Insoluble fiber speeds digestion and can add bulk to the stool. Examples of foods that are high in insoluble fiber include whole-wheat products, wheat bran, cauliflower, green beans, and potatoes.   Why Follow a High-Fiber Diet?   A high-fiber diet is often recommended to prevent and treat constipation , hemorrhoids , diverticulitis , and irritable bowel syndrome . Eating a high-fiber diet can also help improve your cholesterol levels, lower your risk of coronary heart disease , reduce your risk of type 2 diabetes , and lower your weight. For people with type 1 or 2 diabetes, a high-fiber diet can also help stabilize blood sugar levels.   How Much Fiber Should I Eat?   A high-fiber diet should contain  20-35 grams  of fiber a day. This is actually the amount recommended for the general adult population; however, most Americans eat only 15 grams of fiber per day.   Digestion of Fiber   Eating a higher

## 2025-02-26 ENCOUNTER — TELEPHONE (OUTPATIENT)
Dept: GASTROENTEROLOGY | Age: 60
End: 2025-02-26

## 2025-02-26 LAB — SURGICAL PATHOLOGY REPORT: NORMAL

## 2025-04-01 NOTE — PROGRESS NOTES
Reason for Referral:     No referring provider defined for this encounter.    Chief Complaint   Patient presents with    Follow-up     Colon 02/25/25       1. Gastroesophageal reflux disease, unspecified whether esophagitis present    2. Tubular adenoma    3. Diverticulosis    4. Chronic idiopathic constipation        HISTORY OF PRESENT ILLNESS: Mr.Terrance Palacio is a 59 y.o. male with a past history remarkable for marijuana use, BMI 34, follow-up post scope    Patient denies any GI issues.  She denies any constipation, diarrhea, blood in stool.  Regarding upper GI symptoms, he has gerd which is intermittent but no problem swallowing.  Offered EGD however patient refused    SH  Quit smoking tobacco  Smokes marijuana    Previous Endoscopies  2/25/25  Fair prep  8 mm polyp in proximal sigmoid/descending-cold snare TA  Pandiverticulosis with moderate sigmoid diverticulosis with mild patchy erythema 25 to 30 cm from the anus-cold forceps. DDX SCAD, prep related- nonspecific blood and some PMNs  Rt colon bx- NL  Medium internal hemorrhoid    Previous GI workup     Patient's PMH/PSH,SH,PSYCH Hx, MEDs, ALLERGIES, and ROS were all reviewed and updated in the appropriate sections.  I did review all the labs results available for the labs which were ordered by the primary care physician, and the other consultants, we search on epic at [facility] and all the available care everywhere epic  I did review all the imaging studies of the abdomen available on EMR, ordered by the primary care physician and the other consultant  I did review all the pathology from the biopsies done on the previous endoscopies    PAST MEDICAL HISTORY:  Past Medical History:   Diagnosis Date    Depression     Erectile dysfunction 1/21/2016    Gastroesophageal reflux disease 1/21/2016    Mixed hyperlipidemia 1/21/2016       Past Surgical History:   Procedure Laterality Date    COLONOSCOPY N/A 2/25/2025    COLONOSCOPY POLYPECTOMY SNARE/BIOPSY performed

## 2025-04-03 ENCOUNTER — OFFICE VISIT (OUTPATIENT)
Dept: GASTROENTEROLOGY | Age: 60
End: 2025-04-03
Payer: COMMERCIAL

## 2025-04-03 VITALS
BODY MASS INDEX: 34.55 KG/M2 | WEIGHT: 215 LBS | HEIGHT: 66 IN | HEART RATE: 74 BPM | SYSTOLIC BLOOD PRESSURE: 124 MMHG | RESPIRATION RATE: 18 BRPM | OXYGEN SATURATION: 96 % | DIASTOLIC BLOOD PRESSURE: 89 MMHG

## 2025-04-03 DIAGNOSIS — D36.9 TUBULAR ADENOMA: ICD-10-CM

## 2025-04-03 DIAGNOSIS — K59.04 CHRONIC IDIOPATHIC CONSTIPATION: ICD-10-CM

## 2025-04-03 DIAGNOSIS — K57.90 DIVERTICULOSIS: ICD-10-CM

## 2025-04-03 DIAGNOSIS — K21.9 GASTROESOPHAGEAL REFLUX DISEASE, UNSPECIFIED WHETHER ESOPHAGITIS PRESENT: Primary | ICD-10-CM

## 2025-04-03 PROCEDURE — 99214 OFFICE O/P EST MOD 30 MIN: CPT | Performed by: STUDENT IN AN ORGANIZED HEALTH CARE EDUCATION/TRAINING PROGRAM

## 2025-04-03 RX ORDER — OMEPRAZOLE 20 MG/1
20 CAPSULE, DELAYED RELEASE ORAL
Qty: 30 CAPSULE | Refills: 1 | Status: SHIPPED | OUTPATIENT
Start: 2025-04-03

## 2025-05-12 ENCOUNTER — TELEPHONE (OUTPATIENT)
Dept: FAMILY MEDICINE CLINIC | Age: 60
End: 2025-05-12

## 2025-05-12 NOTE — TELEPHONE ENCOUNTER
LMOM for pt to return call to office.   Currently has AWV appt scheduled on 5/23/25 with Yulia, that appt needs to be rescheduled as Yulia will not be in the office.   Advised that I will also send a [employer] message to reschedule appointment.

## 2025-05-18 DIAGNOSIS — Z91.89 CANDIDATE FOR STATIN THERAPY DUE TO RISK OF FUTURE CARDIOVASCULAR EVENT: ICD-10-CM

## 2025-05-19 NOTE — TELEPHONE ENCOUNTER
Last visit: 11/22/*25  Last Med refill: 2/21/25  Does patient have enough medication for 72 hours: unknown, electronic request    Next Visit Date:  Future Appointments   Date Time Provider Department Center   5/30/2025  7:30 AM Yulia Sánchez, APRN - CNP [facility] DE[facility]       Health Maintenance   Topic Date Due    DTaP/Tdap/Td vaccine (1 - Tdap) Never done    Pneumococcal 50+ years Vaccine (1 of 2 - PCV) Never done    Shingles vaccine (1 of 2) Never done    Respiratory Syncytial Virus (RSV) Pregnant or age 60 yrs+ (1 - Risk 60-74 years 1-dose series) Never done    Annual Wellness Visit (Medicare Advantage)  01/01/2025    Depression Screen  05/17/2025    A1C test (Diabetic or Prediabetic)  05/23/2025    Lipids  05/23/2025    Flu vaccine (Season Ended) 11/22/2025 (Originally 8/1/2025)    Colorectal Cancer Screen  02/25/2028    COVID-19 Vaccine  Completed    Hepatitis C screen  Completed    HIV screen  Completed    Hepatitis A vaccine  Aged Out    Hepatitis B vaccine  Aged Out    Hib vaccine  Aged Out    Polio vaccine  Aged Out    Meningococcal (ACWY) vaccine  Aged Out    Meningococcal B vaccine  Aged Out    Prostate Specific Antigen (PSA) Screening or Monitoring  Discontinued       Hemoglobin A1C (%)   Date Value   05/23/2024 5.7   06/01/2023 5.7   05/19/2022 5.5             ( goal A1C is < 7)   No components found for: \"LABMICR\"  No components found for: \"LDLCHOLESTEROL\", \"LDLCALC\"    (goal LDL is <100)   AST (U/L)   Date Value   05/23/2024 32     ALT (U/L)   Date Value   05/23/2024 19     BUN (mg/dL)   Date Value   05/23/2024 11     BP Readings from Last 3 Encounters:   04/03/25 124/89   02/25/25 (!) 133/114   11/22/24 120/80          (goal 120/80)    All Future Testing planned in [facility]              Patient Active Problem List:     Gastroesophageal reflux disease     Erectile dysfunction     Mixed hyperlipidemia     Chronic pain of right ankle         '

## 2025-05-20 RX ORDER — SIMVASTATIN 20 MG
20 TABLET ORAL NIGHTLY
Qty: 90 TABLET | Refills: 0 | Status: SHIPPED | OUTPATIENT
Start: 2025-05-20

## 2025-05-29 SDOH — ECONOMIC STABILITY: FOOD INSECURITY: WITHIN THE PAST 12 MONTHS, THE FOOD YOU BOUGHT JUST DIDN'T LAST AND YOU DIDN'T HAVE MONEY TO GET MORE.: NEVER TRUE

## 2025-05-29 SDOH — ECONOMIC STABILITY: TRANSPORTATION INSECURITY
IN THE PAST 12 MONTHS, HAS THE LACK OF TRANSPORTATION KEPT YOU FROM MEDICAL APPOINTMENTS OR FROM GETTING MEDICATIONS?: NO

## 2025-05-29 SDOH — ECONOMIC STABILITY: INCOME INSECURITY: IN THE LAST 12 MONTHS, WAS THERE A TIME WHEN YOU WERE NOT ABLE TO PAY THE MORTGAGE OR RENT ON TIME?: NO

## 2025-05-29 SDOH — HEALTH STABILITY: PHYSICAL HEALTH: ON AVERAGE, HOW MANY DAYS PER WEEK DO YOU ENGAGE IN MODERATE TO STRENUOUS EXERCISE (LIKE A BRISK WALK)?: 3 DAYS

## 2025-05-29 SDOH — HEALTH STABILITY: PHYSICAL HEALTH: ON AVERAGE, HOW MANY MINUTES DO YOU ENGAGE IN EXERCISE AT THIS LEVEL?: 60 MIN

## 2025-05-29 SDOH — ECONOMIC STABILITY: FOOD INSECURITY: WITHIN THE PAST 12 MONTHS, YOU WORRIED THAT YOUR FOOD WOULD RUN OUT BEFORE YOU GOT MONEY TO BUY MORE.: NEVER TRUE

## 2025-05-29 SDOH — ECONOMIC STABILITY: TRANSPORTATION INSECURITY
IN THE PAST 12 MONTHS, HAS LACK OF TRANSPORTATION KEPT YOU FROM MEETINGS, WORK, OR FROM GETTING THINGS NEEDED FOR DAILY LIVING?: NO

## 2025-05-29 ASSESSMENT — PATIENT HEALTH QUESTIONNAIRE - PHQ9
SUM OF ALL RESPONSES TO PHQ QUESTIONS 1-9: 0
SUM OF ALL RESPONSES TO PHQ QUESTIONS 1-9: 0
1. LITTLE INTEREST OR PLEASURE IN DOING THINGS: NOT AT ALL
SUM OF ALL RESPONSES TO PHQ QUESTIONS 1-9: 0
2. FEELING DOWN, DEPRESSED OR HOPELESS: NOT AT ALL
SUM OF ALL RESPONSES TO PHQ QUESTIONS 1-9: 0

## 2025-05-29 ASSESSMENT — LIFESTYLE VARIABLES
HOW MANY STANDARD DRINKS CONTAINING ALCOHOL DO YOU HAVE ON A TYPICAL DAY: 1
HOW OFTEN DO YOU HAVE SIX OR MORE DRINKS ON ONE OCCASION: 1
HOW MANY STANDARD DRINKS CONTAINING ALCOHOL DO YOU HAVE ON A TYPICAL DAY: 1 OR 2
HOW OFTEN DO YOU HAVE A DRINK CONTAINING ALCOHOL: 4
HOW OFTEN DO YOU HAVE A DRINK CONTAINING ALCOHOL: 2-3 TIMES A WEEK

## 2025-05-30 ENCOUNTER — HOSPITAL ENCOUNTER (OUTPATIENT)
Age: 60
Setting detail: SPECIMEN
Discharge: HOME | End: 2025-05-30

## 2025-05-30 ENCOUNTER — OFFICE VISIT (OUTPATIENT)
Dept: FAMILY MEDICINE CLINIC | Age: 60
End: 2025-05-30

## 2025-05-30 VITALS
DIASTOLIC BLOOD PRESSURE: 80 MMHG | WEIGHT: 212 LBS | SYSTOLIC BLOOD PRESSURE: 110 MMHG | BODY MASS INDEX: 34.07 KG/M2 | HEART RATE: 73 BPM | OXYGEN SATURATION: 98 % | HEIGHT: 66 IN

## 2025-05-30 DIAGNOSIS — Z12.5 SCREENING FOR MALIGNANT NEOPLASM OF PROSTATE: ICD-10-CM

## 2025-05-30 DIAGNOSIS — Z00.00 MEDICARE ANNUAL WELLNESS VISIT, SUBSEQUENT: ICD-10-CM

## 2025-05-30 DIAGNOSIS — Z86.39 HISTORY OF ELEVATED GLUCOSE: ICD-10-CM

## 2025-05-30 DIAGNOSIS — E78.2 MIXED HYPERLIPIDEMIA: Primary | ICD-10-CM

## 2025-05-30 DIAGNOSIS — E78.2 MIXED HYPERLIPIDEMIA: ICD-10-CM

## 2025-05-30 DIAGNOSIS — Z79.899 MEDICATION MANAGEMENT: ICD-10-CM

## 2025-05-30 DIAGNOSIS — Z12.5 SCREENING FOR PROSTATE CANCER: ICD-10-CM

## 2025-05-30 LAB
ALBUMIN SERPL-MCNC: 4.2 G/DL (ref 3.5–5.2)
ALBUMIN/GLOB SERPL: 1.4 {RATIO} (ref 1–2.5)
ALP SERPL-CCNC: 96 U/L (ref 40–129)
ALT SERPL-CCNC: 24 U/L (ref 10–50)
ANION GAP SERPL CALCULATED.3IONS-SCNC: 12 MMOL/L (ref 9–16)
AST SERPL-CCNC: 22 U/L (ref 10–50)
BILIRUB SERPL-MCNC: 0.4 MG/DL (ref 0–1.2)
BUN SERPL-MCNC: 11 MG/DL (ref 8–23)
CALCIUM SERPL-MCNC: 9.5 MG/DL (ref 8.6–10.4)
CHLORIDE SERPL-SCNC: 106 MMOL/L (ref 98–107)
CHOLEST SERPL-MCNC: 157 MG/DL (ref 0–199)
CHOLESTEROL/HDL RATIO: 3.6
CO2 SERPL-SCNC: 23 MMOL/L (ref 20–31)
CREAT SERPL-MCNC: 0.9 MG/DL (ref 0.7–1.2)
ERYTHROCYTE [DISTWIDTH] IN BLOOD BY AUTOMATED COUNT: 17.8 % (ref 11.8–14.4)
EST. AVERAGE GLUCOSE BLD GHB EST-MCNC: 120 MG/DL
GFR, ESTIMATED: >90 ML/MIN/1.73M2
GLUCOSE SERPL-MCNC: 105 MG/DL (ref 74–99)
HBA1C MFR BLD: 5.8 % (ref 4–6)
HCT VFR BLD AUTO: 47.1 % (ref 40.7–50.3)
HDLC SERPL-MCNC: 44 MG/DL
HGB BLD-MCNC: 15 G/DL (ref 13–17)
LDLC SERPL CALC-MCNC: 98 MG/DL (ref 0–100)
MCH RBC QN AUTO: 25.4 PG (ref 25.2–33.5)
MCHC RBC AUTO-ENTMCNC: 31.8 G/DL (ref 28.4–34.8)
MCV RBC AUTO: 79.7 FL (ref 82.6–102.9)
NRBC BLD-RTO: 0 PER 100 WBC
PLATELET # BLD AUTO: 215 K/UL (ref 138–453)
PMV BLD AUTO: 11.3 FL (ref 8.1–13.5)
POTASSIUM SERPL-SCNC: 4.7 MMOL/L (ref 3.7–5.3)
PROT SERPL-MCNC: 7.1 G/DL (ref 6.6–8.7)
PSA SERPL-MCNC: 1.28 NG/ML (ref 0–4)
RBC # BLD AUTO: 5.91 M/UL (ref 4.21–5.77)
SODIUM SERPL-SCNC: 141 MMOL/L (ref 136–145)
TRIGL SERPL-MCNC: 75 MG/DL (ref 0–149)
VLDLC SERPL CALC-MCNC: 15 MG/DL (ref 1–30)
WBC OTHER # BLD: 8.4 K/UL (ref 3.5–11.3)

## 2025-05-30 RX ORDER — FAMOTIDINE 20 MG/1
20 TABLET, FILM COATED ORAL DAILY PRN
Qty: 30 TABLET | Refills: 11 | Status: SHIPPED | OUTPATIENT
Start: 2025-05-30

## 2025-05-30 NOTE — PROGRESS NOTES
Sitting   BP Cuff Size: Large Adult   Pulse: 73   SpO2: 98%   Weight: 96.2 kg (212 lb)   Height: 1.676 m (5' 6\")      Body mass index is 34.22 kg/m².      Wt Readings from Last 3 Encounters:   05/30/25 96.2 kg (212 lb)   04/03/25 97.5 kg (215 lb)   02/25/25 94.3 kg (208 lb)                 No Known Allergies  Prior to Visit Medications    Medication Sig Taking? Authorizing Provider   famotidine (PEPCID) 20 MG tablet Take 1 tablet by mouth daily as needed (heart burn) Yes Yulia Sánchez APRN - CNP   simvastatin (ZOCOR) 20 MG tablet TAKE 1 TABLET BY MOUTH AT NIGHT Yes Yulia Sánchez APRN - CNP   omeprazole (PRILOSEC) 20 MG delayed release capsule Take 1 capsule by mouth every morning (before breakfast) Yes Danny Campbell MD   Handicap Placard MISC by Does not apply route 11/2029 Yes Yulia Sánchez APRN - CNP   sodium-potassium-mag sulfate (SUPREP) 17.5-3.13-1.6 GM/177ML SOLN solution Please follow instructions given to you by your provider.  Patient not taking: Reported on 5/30/2025  Danny Campbell MD       [facility] (Including outside providers/suppliers regularly involved in providing care):   Patient Care Team:  Yulia Sánchez APRN - CNP as PCP - General (Nurse Practitioner, Family)  Yulia Sánchez APRN - CNP as PCP - Empaneled Provider  Altagracia Chung DPM as Physician (Podiatry)     Recommendations for Preventive Services Due: see orders and patient instructions/AVS.  Recommended screening schedule for the next 5-10 years is provided to the patient in written form: see Patient Instructions/AVS.     Reviewed and updated this visit:  Allergies  Meds  Sexual Hx      Sexual History: Patient declined to answer.

## 2025-06-19 ENCOUNTER — RESULTS FOLLOW-UP (OUTPATIENT)
Dept: FAMILY MEDICINE CLINIC | Age: 60
End: 2025-06-19

## 2025-08-14 DIAGNOSIS — K21.9 GASTROESOPHAGEAL REFLUX DISEASE, UNSPECIFIED WHETHER ESOPHAGITIS PRESENT: ICD-10-CM

## 2025-08-14 DIAGNOSIS — Z91.89 CANDIDATE FOR STATIN THERAPY DUE TO RISK OF FUTURE CARDIOVASCULAR EVENT: ICD-10-CM

## 2025-08-15 RX ORDER — OMEPRAZOLE 20 MG/1
CAPSULE, DELAYED RELEASE ORAL
Qty: 30 CAPSULE | Refills: 0 | Status: SHIPPED | OUTPATIENT
Start: 2025-08-15

## 2025-08-15 RX ORDER — SIMVASTATIN 20 MG
20 TABLET ORAL NIGHTLY
Qty: 90 TABLET | Refills: 1 | Status: SHIPPED | OUTPATIENT
Start: 2025-08-15

## (undated) DEVICE — GLOVE ORANGE PI 8 1/2   MSG9085

## (undated) DEVICE — ENDO KIT W/SYRINGE: Brand: MEDLINE INDUSTRIES, INC.

## (undated) DEVICE — DEFENDO AIR WATER SUCTION AND BIOPSY VALVE KIT FOR  OLYMPUS: Brand: DEFENDO AIR/WATER/SUCTION AND BIOPSY VALVE

## (undated) DEVICE — SNARE ENDOSCP AD L240CM LOOP W10MM SHTH DIA2.4MM RND INSUL

## (undated) DEVICE — POLYP TRAP: Brand: TRAPEASE®

## (undated) DEVICE — FORCEPS BX L240CM WRK CHN 2.8MM STD CAP W/ NDL MIC MESH